# Patient Record
Sex: FEMALE | Race: WHITE | NOT HISPANIC OR LATINO | URBAN - METROPOLITAN AREA
[De-identification: names, ages, dates, MRNs, and addresses within clinical notes are randomized per-mention and may not be internally consistent; named-entity substitution may affect disease eponyms.]

---

## 2018-03-09 RX ORDER — SIMVASTATIN 20 MG/1
20 TABLET, FILM COATED ORAL NIGHTLY
Qty: 90 TABLET | Refills: 0 | Status: SHIPPED | OUTPATIENT
Start: 2018-03-09 | End: 2018-05-31 | Stop reason: SDUPTHER

## 2018-03-09 RX ORDER — SIMVASTATIN 20 MG/1
20 TABLET, FILM COATED ORAL NIGHTLY
COMMUNITY
Start: 2017-11-27 | End: 2018-03-09 | Stop reason: SDUPTHER

## 2018-05-30 RX ORDER — FLUTICASONE PROPIONATE 50 MCG
SPRAY, SUSPENSION (ML) NASAL
COMMUNITY
Start: 2014-09-22

## 2018-05-30 RX ORDER — CITALOPRAM 40 MG/1
40 TABLET, FILM COATED ORAL DAILY
COMMUNITY
Start: 2017-11-27 | End: 2018-08-17 | Stop reason: SDUPTHER

## 2018-05-30 RX ORDER — PANTOPRAZOLE SODIUM 40 MG/1
40 TABLET, DELAYED RELEASE ORAL
COMMUNITY
Start: 2017-05-15 | End: 2022-06-13 | Stop reason: SDUPTHER

## 2018-05-30 RX ORDER — LISINOPRIL 10 MG/1
10 TABLET ORAL DAILY
COMMUNITY
Start: 2018-01-22 | End: 2018-08-17 | Stop reason: SDUPTHER

## 2018-05-30 RX ORDER — FEXOFENADINE HCL AND PSEUDOEPHEDRINE HCL 180; 240 MG/1; MG/1
TABLET, EXTENDED RELEASE ORAL
COMMUNITY
Start: 2016-11-14 | End: 2019-03-25 | Stop reason: SDUPTHER

## 2018-05-31 ENCOUNTER — OFFICE VISIT (OUTPATIENT)
Dept: FAMILY MEDICINE | Facility: CLINIC | Age: 58
End: 2018-05-31
Attending: FAMILY MEDICINE
Payer: COMMERCIAL

## 2018-05-31 VITALS
HEART RATE: 84 BPM | OXYGEN SATURATION: 97 % | BODY MASS INDEX: 27.05 KG/M2 | WEIGHT: 147 LBS | DIASTOLIC BLOOD PRESSURE: 72 MMHG | TEMPERATURE: 98.9 F | HEIGHT: 62 IN | SYSTOLIC BLOOD PRESSURE: 114 MMHG | RESPIRATION RATE: 16 BRPM

## 2018-05-31 DIAGNOSIS — K21.9 GASTROESOPHAGEAL REFLUX DISEASE WITHOUT ESOPHAGITIS: ICD-10-CM

## 2018-05-31 DIAGNOSIS — J30.1 CHRONIC SEASONAL ALLERGIC RHINITIS DUE TO POLLEN: ICD-10-CM

## 2018-05-31 DIAGNOSIS — I10 ESSENTIAL HYPERTENSION: Primary | ICD-10-CM

## 2018-05-31 DIAGNOSIS — F32.A ANXIETY AND DEPRESSION: ICD-10-CM

## 2018-05-31 DIAGNOSIS — E78.2 MIXED HYPERLIPIDEMIA: ICD-10-CM

## 2018-05-31 DIAGNOSIS — F41.9 ANXIETY AND DEPRESSION: ICD-10-CM

## 2018-05-31 PROCEDURE — 99214 OFFICE O/P EST MOD 30 MIN: CPT | Performed by: FAMILY MEDICINE

## 2018-05-31 RX ORDER — SIMVASTATIN 20 MG/1
20 TABLET, FILM COATED ORAL NIGHTLY
Qty: 90 TABLET | Refills: 1 | Status: SHIPPED | OUTPATIENT
Start: 2018-05-31 | End: 2018-12-03

## 2018-05-31 ASSESSMENT — ENCOUNTER SYMPTOMS
FREQUENCY: 0
CHILLS: 0
COLOR CHANGE: 0
ARTHRALGIAS: 0
RHINORRHEA: 1
FATIGUE: 0
SHORTNESS OF BREATH: 0
DYSURIA: 0
HEADACHES: 0
DIARRHEA: 0
ABDOMINAL PAIN: 0
UNEXPECTED WEIGHT CHANGE: 0
SLEEP DISTURBANCE: 0
NUMBNESS: 0
CONSTIPATION: 0
NAUSEA: 0
COUGH: 0
BLOOD IN STOOL: 0
DIZZINESS: 0
ADENOPATHY: 0
HEMATURIA: 0
BRUISES/BLEEDS EASILY: 0
PALPITATIONS: 0

## 2018-05-31 NOTE — PROGRESS NOTES
Subjective      Patient ID: Lise Alonso is a 58 y.o. female.    HPI    Medical managemen of hypertension, hyperlipidemia  Pre visit care team analia performed  Following diet - mod exercise  Concerns : allergic sx - restarted allegra D    The following have been reviewed and updated as appropriate in this visit:  Problems       Review of Systems   Constitutional: Negative for chills, fatigue and unexpected weight change.   HENT: Positive for rhinorrhea.    Eyes: Negative for visual disturbance.   Respiratory: Negative for cough and shortness of breath.    Cardiovascular: Negative for chest pain, palpitations and leg swelling.   Gastrointestinal: Negative for abdominal pain, blood in stool, constipation, diarrhea and nausea.   Endocrine: Negative for cold intolerance and heat intolerance.   Genitourinary: Negative for dysuria, frequency, hematuria and urgency.   Musculoskeletal: Negative for arthralgias (right thumb/knees - mild).   Skin: Negative for color change.        Check right thigh   Neurological: Negative for dizziness, numbness and headaches.   Hematological: Negative for adenopathy. Does not bruise/bleed easily.   Psychiatric/Behavioral: Negative for sleep disturbance.       Current Outpatient Prescriptions   Medication Sig Dispense Refill   • Bifidobacterium infantis (ALIGN) 4 mg capsule 4 mg.     • citalopram (CELEXA) 40 mg tablet Take 40 mg by mouth daily.     • FA/mv,Ca,iron,min/lycopene/lut (MULTIVITAL ORAL) No SIG Entered     • fexofenadine-pseudoephedrine (ALLEGRA-D 24 HOUR) 180-240 mg per 24 hr tablet take 1 tablet by oral route  every day on an empty stomach with a glass of water     • fluticasone (FLONASE ALLERGY RELIEF) 50 mcg/actuation nasal spray inhale 1 spray (50MCG)  by intranasal route  every day in each nostril     • lisinopril (PRINIVIL) 10 mg tablet Take 10 mg by mouth daily.     • omega-3 fatty acids/fish oil (FISH OIL OMEGA 3-6-9 ORAL) No SIG Entered     • pantoprazole (PROTONIX) 40  "mg EC tablet Take 40 mg by mouth.     • simvastatin (ZOCOR) 20 mg tablet Take 1 tablet (20 mg total) by mouth nightly. 90 tablet 1     No current facility-administered medications for this visit.      History reviewed. No pertinent past medical history.  Family History   Problem Relation Age of Onset   • Heart disease Father    • Hyperlipidemia Father      Past Surgical History:   Procedure Laterality Date   • CHOLECYSTECTOMY     • OOPHORECTOMY     • TUBAL LIGATION       Allergies   Allergen Reactions   • Amoxicillin    • Azithromycin    • Erythromycin Base    • Levofloxacin    • Penicillins    • Sulfanilamide    • Tetracycline      Social History     Social History   • Marital status:      Spouse name: N/A   • Number of children: N/A   • Years of education: N/A     Occupational History   • Not on file.     Social History Main Topics   • Smoking status: Never Smoker   • Smokeless tobacco: Never Used   • Alcohol use Not on file   • Drug use: Unknown   • Sexual activity: Not on file     Other Topics Concern   • Not on file     Social History Narrative   • No narrative on file     Vitals:    05/31/18 1639 05/31/18 1656   BP: 112/78 114/72   BP Location:  Left upper arm   Pulse: 84    Resp: 16    Temp: 37.2 °C (98.9 °F)    SpO2: 97%    Weight: 66.7 kg (147 lb)    Height: 1.575 m (5' 2\")        Objective     Physical Exam   Constitutional: She is oriented to person, place, and time. She appears well-developed and well-nourished.   Eyes: EOM are normal. Pupils are equal, round, and reactive to light.   Fundoscopic exam:       The right eye shows no AV nicking.        The left eye shows no AV nicking.   Neck: Carotid bruit is not present. No thyromegaly present.   Cardiovascular: Normal rate, regular rhythm and normal pulses.    No murmur heard.  Pulses:       Dorsalis pedis pulses are 2+ on the right side, and 2+ on the left side.        Posterior tibial pulses are 2+ on the right side, and 2+ on the left side. "   Pulmonary/Chest: Effort normal and breath sounds normal.   Abdominal: Soft. Normal appearance and bowel sounds are normal. There is no tenderness. There is no CVA tenderness.   Lymphadenopathy:     She has no cervical adenopathy.     She has no axillary adenopathy.   Neurological: She is alert and oriented to person, place, and time. No sensory deficit.   Skin: Skin is warm, dry and intact. No rash noted.   Psychiatric: She has a normal mood and affect.       Assessment/Plan   Problem List Items Addressed This Visit     Chronic seasonal allergic rhinitis due to pollen     same meds - long term safety meds discussed         Anxiety and depression     same meds - long term safety meds discussed         Essential hypertension - Primary     Hypertension well controlled  Recommend regular exercise and low salt diet  Risk and potential complications of hypertension discussed  Importance of medication compliance and regular follow up emphasized  Role of medication/diet/exercise in treating hypertension discussed  Treatment plan and follow up reviewed and understood by patient         Gastroesophageal reflux disease without esophagitis     Patient was counselled regarding triggers for symptoms - avoid caffeine/spicey foods/NO smoking  Elevate HOB 30 degrees - DO NOT lie flat for at least 30 minutes after eating  Take medications as directed         Mixed hyperlipidemia     GOOD hyperlipidemia control  CONTINUE CURRENT THERAPY  Recommend low fat diet - Risks and potential complications of hyperlipidemia reviewed  Role of medications,diet, exercise in the treatment of hyperlipidemia discussed   Treatment plan and follow up reviewed and understood by patient         Relevant Medications    simvastatin (ZOCOR) 20 mg tablet          Jennifer Moulton DO  6/2/2018

## 2018-08-17 RX ORDER — CITALOPRAM 40 MG/1
TABLET, FILM COATED ORAL
Qty: 90 TABLET | Refills: 1 | Status: SHIPPED | OUTPATIENT
Start: 2018-08-17 | End: 2018-08-18 | Stop reason: SDUPTHER

## 2018-08-17 RX ORDER — LISINOPRIL 10 MG/1
TABLET ORAL
Qty: 90 TABLET | Refills: 1 | Status: SHIPPED | OUTPATIENT
Start: 2018-08-17 | End: 2018-08-18 | Stop reason: SDUPTHER

## 2018-08-19 RX ORDER — LISINOPRIL 10 MG/1
TABLET ORAL
Qty: 90 TABLET | Refills: 1 | Status: SHIPPED | OUTPATIENT
Start: 2018-08-19 | End: 2019-08-07 | Stop reason: SDUPTHER

## 2018-08-19 RX ORDER — CITALOPRAM 40 MG/1
TABLET, FILM COATED ORAL
Qty: 90 TABLET | Refills: 1 | Status: SHIPPED | OUTPATIENT
Start: 2018-08-19 | End: 2019-08-07 | Stop reason: SDUPTHER

## 2018-12-03 ENCOUNTER — OFFICE VISIT (OUTPATIENT)
Dept: FAMILY MEDICINE | Facility: CLINIC | Age: 58
End: 2018-12-03
Payer: COMMERCIAL

## 2018-12-03 VITALS
OXYGEN SATURATION: 97 % | SYSTOLIC BLOOD PRESSURE: 116 MMHG | BODY MASS INDEX: 27.79 KG/M2 | WEIGHT: 151 LBS | HEIGHT: 62 IN | RESPIRATION RATE: 16 BRPM | HEART RATE: 74 BPM | DIASTOLIC BLOOD PRESSURE: 74 MMHG | TEMPERATURE: 98.8 F

## 2018-12-03 DIAGNOSIS — I10 ESSENTIAL HYPERTENSION: ICD-10-CM

## 2018-12-03 DIAGNOSIS — F32.A ANXIETY AND DEPRESSION: ICD-10-CM

## 2018-12-03 DIAGNOSIS — K21.9 GASTROESOPHAGEAL REFLUX DISEASE WITHOUT ESOPHAGITIS: ICD-10-CM

## 2018-12-03 DIAGNOSIS — E78.2 MIXED HYPERLIPIDEMIA: ICD-10-CM

## 2018-12-03 DIAGNOSIS — Z82.49 FAMILY HISTORY OF CORONARY ARTERY DISEASE IN FATHER: ICD-10-CM

## 2018-12-03 DIAGNOSIS — Z00.00 WELL WOMAN EXAM WITHOUT GYNECOLOGICAL EXAM: Primary | ICD-10-CM

## 2018-12-03 DIAGNOSIS — J30.1 CHRONIC SEASONAL ALLERGIC RHINITIS DUE TO POLLEN: ICD-10-CM

## 2018-12-03 DIAGNOSIS — F41.9 ANXIETY AND DEPRESSION: ICD-10-CM

## 2018-12-03 PROCEDURE — 99396 PREV VISIT EST AGE 40-64: CPT | Mod: 25 | Performed by: FAMILY MEDICINE

## 2018-12-03 PROCEDURE — 93000 ELECTROCARDIOGRAM COMPLETE: CPT | Performed by: FAMILY MEDICINE

## 2018-12-03 ASSESSMENT — ENCOUNTER SYMPTOMS
WEAKNESS: 0
CHEST TIGHTNESS: 0
DYSURIA: 0
CONSTIPATION: 1
SEIZURES: 0
FATIGUE: 0
DYSPHORIC MOOD: 0
PALPITATIONS: 0
SORE THROAT: 0
SHORTNESS OF BREATH: 0
WHEEZING: 0
BRUISES/BLEEDS EASILY: 0
COUGH: 0
ARTHRALGIAS: 0
FREQUENCY: 0
TREMORS: 0
BLOOD IN STOOL: 0
ABDOMINAL PAIN: 1
LIGHT-HEADEDNESS: 0
SLEEP DISTURBANCE: 0
COLOR CHANGE: 0
VOICE CHANGE: 0
DIZZINESS: 0
HEADACHES: 0
DIARRHEA: 0
CHILLS: 0
BACK PAIN: 0
FEVER: 0
NAUSEA: 0
STRIDOR: 0
UNEXPECTED WEIGHT CHANGE: 0
ADENOPATHY: 0

## 2018-12-03 NOTE — PATIENT INSTRUCTIONS
Routine advice given on diet/exercise/weight  Recommend monthly self breast exams  Discussed colorectal screening - pros/cons of colonoscopy vs stool FIT - colon current  Recommend calcium with D3 -  Caltrate 500  Immunization update discussed

## 2018-12-03 NOTE — PROGRESS NOTES
Subjective      Patient ID: Lise Alonso is a 58 y.o. female.    HPI    Generally well  Following diet - reg exercise  Concerns : seeing GI - miralax / occ bladder pressure    The following have been reviewed and updated as appropriate in this visit:  Problems       Review of Systems   Constitutional: Negative for chills, fatigue, fever and unexpected weight change.   HENT: Negative for ear pain, hearing loss, sore throat, tinnitus and voice change.    Eyes: Negative for visual disturbance (eye exam due).   Respiratory: Negative for cough, chest tightness, shortness of breath, wheezing and stridor.    Cardiovascular: Negative for chest pain, palpitations and leg swelling.   Gastrointestinal: Positive for abdominal pain (known GERD - intermitt PPI/h2 blocker) and constipation. Negative for blood in stool, diarrhea and nausea.   Endocrine: Negative for cold intolerance and heat intolerance.   Genitourinary: Negative for dysuria, frequency, pelvic pain, vaginal bleeding and vaginal discharge.   Musculoskeletal: Negative for arthralgias, back pain and gait problem.   Skin: Negative for color change.   Neurological: Negative for dizziness, tremors, seizures, syncope, weakness, light-headedness and headaches.   Hematological: Negative for adenopathy. Does not bruise/bleed easily.   Psychiatric/Behavioral: Negative for dysphoric mood and sleep disturbance.       Current Outpatient Prescriptions   Medication Sig Dispense Refill   • Bifidobacterium infantis (ALIGN) 4 mg capsule 4 mg.     • citalopram (CELEXA) 40 mg tablet TAKE 1 TABLET BY MOUTH DAILY 90 tablet 1   • FA/mv,Ca,iron,min/lycopene/lut (MULTIVITAL ORAL) No SIG Entered     • fexofenadine-pseudoephedrine (ALLEGRA-D 24 HOUR) 180-240 mg per 24 hr tablet take 1 tablet by oral route  every day on an empty stomach with a glass of water     • fluticasone (FLONASE ALLERGY RELIEF) 50 mcg/actuation nasal spray inhale 1 spray (50MCG)  by intranasal route  every day in each  "nostril     • lisinopril (PRINIVIL) 10 mg tablet TAKE 1 TABLET BY MOUTH DAILY 90 tablet 1   • omega-3 fatty acids/fish oil (FISH OIL OMEGA 3-6-9 ORAL) No SIG Entered     • pantoprazole (PROTONIX) 40 mg EC tablet Take 40 mg by mouth.     • simvastatin (ZOCOR) 20 mg tablet TAKE 1 TABLET BY MOUTH NIGHTLY. 90 tablet 1     No current facility-administered medications for this visit.      History reviewed. No pertinent past medical history.  Family History   Problem Relation Age of Onset   • Heart disease Father    • Hyperlipidemia Father      Past Surgical History:   Procedure Laterality Date   • CHOLECYSTECTOMY     • OOPHORECTOMY     • TUBAL LIGATION       Allergies   Allergen Reactions   • Amoxicillin    • Azithromycin    • Erythromycin Base    • Levofloxacin    • Penicillins    • Sulfanilamide    • Tetracycline      Social History     Social History   • Marital status:      Spouse name: N/A   • Number of children: N/A   • Years of education: N/A     Occupational History   • Not on file.     Social History Main Topics   • Smoking status: Never Smoker   • Smokeless tobacco: Never Used   • Alcohol use Not on file   • Drug use: Unknown   • Sexual activity: Not on file     Other Topics Concern   • Not on file     Social History Narrative   • No narrative on file     Vitals:    12/03/18 1759 12/03/18 1820   BP: 122/70 116/74   BP Location:  Left upper arm   Patient Position:  Sitting   Pulse: 74    Resp: 16    Temp: 37.1 °C (98.8 °F)    SpO2: 97%    Weight: 68.5 kg (151 lb)    Height: 1.575 m (5' 2\")        Objective     Physical Exam   Constitutional: She is oriented to person, place, and time. She appears well-developed and well-nourished.   HENT:   Head: Normocephalic.   Right Ear: Tympanic membrane normal.   Left Ear: Tympanic membrane normal.   Nose: Nose normal.   Mouth/Throat: Uvula is midline and oropharynx is clear and moist.   Eyes: EOM and lids are normal. Pupils are equal, round, and reactive to light. " Lids are everted and swept, no foreign bodies found.   Fundoscopic exam:       The right eye shows no AV nicking and no exudate.        The left eye shows no AV nicking and no exudate.   Neck: Carotid bruit is not present. No thyromegaly present.   Cardiovascular: Normal rate, regular rhythm, normal heart sounds, intact distal pulses and normal pulses.    No murmur heard.  Pulmonary/Chest: Effort normal and breath sounds normal.   Abdominal: Soft. Normal appearance, normal aorta and bowel sounds are normal. She exhibits no abdominal bruit. There is no hepatosplenomegaly. There is no tenderness. There is no CVA tenderness. Hernia confirmed negative in the right inguinal area and confirmed negative in the left inguinal area.   Genitourinary: Vagina normal. Right adnexum displays no mass. Left adnexum displays no mass.   Lymphadenopathy:     She has no cervical adenopathy.     She has no axillary adenopathy.   Neurological: She is alert and oriented to person, place, and time.   Skin: Skin is warm. No rash noted.   Psychiatric: She has a normal mood and affect. Her speech is normal and behavior is normal. Cognition and memory are normal.       Assessment/Plan   Problem List Items Addressed This Visit     Chronic seasonal allergic rhinitis due to pollen     same meds - long term safety meds discussed         Anxiety and depression     same meds - long term safety meds discussed         Essential hypertension     Hypertension well controlled  Recommend regular exercise and low salt diet  Risk and potential complications of hypertension discussed  Importance of medication compliance and regular follow up emphasized  Role of medication/diet/exercise in treating hypertension discussed  Treatment plan and follow up reviewed and understood by patient         Relevant Orders    ECG 12 LEAD OFFICE PERFORMED (Completed)    CBC and Differential    Gastroesophageal reflux disease without esophagitis     Patient was counselled  regarding triggers for symptoms - avoid caffeine/spicey foods/NO smoking  Elevate HOB 30 degrees - DO NOT lie flat for at least 30 minutes after eating  Take medications as directed         Mixed hyperlipidemia     GOOD hyperlipidemia control  CONTINUE CURRENT THERAPY  Recommend low fat diet - Risks and potential complications of hyperlipidemia reviewed  Role of medications,diet, exercise in the treatment of hyperlipidemia discussed   Treatment plan and follow up reviewed and understood by patient         Relevant Orders    Comprehensive metabolic panel    Lipid panel    TSH 3rd Generation    Family history of coronary artery disease in father      Other Visit Diagnoses     Well woman exam without gynecological exam    -  Primary          Jennifer Madrigal-Jamarifracisco,   12/4/2018

## 2019-03-25 RX ORDER — FEXOFENADINE HCL AND PSEUDOEPHEDRINE HCL 180; 240 MG/1; MG/1
TABLET, EXTENDED RELEASE ORAL
Qty: 90 TABLET | Refills: 1 | Status: SHIPPED | OUTPATIENT
Start: 2019-03-25 | End: 2020-08-26 | Stop reason: SDUPTHER

## 2019-05-21 RX ORDER — SIMVASTATIN 20 MG/1
TABLET, FILM COATED ORAL
Qty: 90 TABLET | Refills: 1 | Status: SHIPPED | OUTPATIENT
Start: 2019-05-21 | End: 2019-11-15 | Stop reason: SDUPTHER

## 2019-06-04 LAB
ALBUMIN SERPL-MCNC: 4.5 G/DL (ref 3.6–5.1)
ALBUMIN/GLOB SERPL: 1.9 (CALC) (ref 1–2.5)
ALP SERPL-CCNC: 68 U/L (ref 33–130)
ALT SERPL-CCNC: 30 U/L (ref 6–29)
AST SERPL-CCNC: 23 U/L (ref 10–35)
BASOPHILS # BLD AUTO: 71 CELLS/UL (ref 0–200)
BASOPHILS NFR BLD AUTO: 1.5 %
BILIRUB SERPL-MCNC: 0.4 MG/DL (ref 0.2–1.2)
BUN SERPL-MCNC: 13 MG/DL (ref 7–25)
BUN/CREAT SERPL: ABNORMAL (CALC) (ref 6–22)
CALCIUM SERPL-MCNC: 9.2 MG/DL (ref 8.6–10.4)
CHLORIDE SERPL-SCNC: 107 MMOL/L (ref 98–110)
CHOLEST SERPL-MCNC: 206 MG/DL
CHOLEST/HDLC SERPL: 4.5 (CALC)
CO2 SERPL-SCNC: 26 MMOL/L (ref 20–32)
CREAT SERPL-MCNC: 0.88 MG/DL (ref 0.5–0.99)
EOSINOPHIL # BLD AUTO: 188 CELLS/UL (ref 15–500)
EOSINOPHIL NFR BLD AUTO: 4 %
ERYTHROCYTE [DISTWIDTH] IN BLOOD BY AUTOMATED COUNT: 12.5 % (ref 11–15)
GLOBULIN SER CALC-MCNC: 2.4 G/DL (CALC) (ref 1.9–3.7)
GLUCOSE SERPL-MCNC: 83 MG/DL (ref 65–99)
HCT VFR BLD AUTO: 38.6 % (ref 35–45)
HDLC SERPL-MCNC: 46 MG/DL
HGB BLD-MCNC: 12.7 G/DL (ref 11.7–15.5)
LDLC SERPL CALC-MCNC: 115 MG/DL (CALC)
LYMPHOCYTES # BLD AUTO: 1325 CELLS/UL (ref 850–3900)
LYMPHOCYTES NFR BLD AUTO: 28.2 %
MCH RBC QN AUTO: 29 PG (ref 27–33)
MCHC RBC AUTO-ENTMCNC: 32.9 G/DL (ref 32–36)
MCV RBC AUTO: 88.1 FL (ref 80–100)
MONOCYTES # BLD AUTO: 428 CELLS/UL (ref 200–950)
MONOCYTES NFR BLD AUTO: 9.1 %
NEUTROPHILS # BLD AUTO: 2688 CELLS/UL (ref 1500–7800)
NEUTROPHILS NFR BLD AUTO: 57.2 %
NONHDLC SERPL-MCNC: 160 MG/DL (CALC)
PLATELET # BLD AUTO: 242 THOUSAND/UL (ref 140–400)
PMV BLD REES-ECKER: 10.7 FL (ref 7.5–12.5)
POTASSIUM SERPL-SCNC: 4.5 MMOL/L (ref 3.5–5.3)
PROT SERPL-MCNC: 6.9 G/DL (ref 6.1–8.1)
QUEST EGFR NON-AFR. AMERICAN: 71 ML/MIN/1.73M2
RBC # BLD AUTO: 4.38 MILLION/UL (ref 3.8–5.1)
SODIUM SERPL-SCNC: 141 MMOL/L (ref 135–146)
TRIGL SERPL-MCNC: 316 MG/DL
TSH SERPL-ACNC: 2.06 MIU/L (ref 0.4–4.5)
WBC # BLD AUTO: 4.7 THOUSAND/UL (ref 3.8–10.8)

## 2019-06-10 ENCOUNTER — OFFICE VISIT (OUTPATIENT)
Dept: FAMILY MEDICINE | Facility: CLINIC | Age: 59
End: 2019-06-10
Payer: COMMERCIAL

## 2019-06-10 VITALS
RESPIRATION RATE: 16 BRPM | HEART RATE: 86 BPM | DIASTOLIC BLOOD PRESSURE: 70 MMHG | OXYGEN SATURATION: 99 % | BODY MASS INDEX: 27.6 KG/M2 | WEIGHT: 150 LBS | HEIGHT: 62 IN | SYSTOLIC BLOOD PRESSURE: 120 MMHG

## 2019-06-10 DIAGNOSIS — E78.2 MIXED HYPERLIPIDEMIA: ICD-10-CM

## 2019-06-10 DIAGNOSIS — F32.A ANXIETY AND DEPRESSION: ICD-10-CM

## 2019-06-10 DIAGNOSIS — K21.9 GASTROESOPHAGEAL REFLUX DISEASE WITHOUT ESOPHAGITIS: ICD-10-CM

## 2019-06-10 DIAGNOSIS — I10 ESSENTIAL HYPERTENSION: Primary | ICD-10-CM

## 2019-06-10 DIAGNOSIS — J30.1 CHRONIC SEASONAL ALLERGIC RHINITIS DUE TO POLLEN: ICD-10-CM

## 2019-06-10 DIAGNOSIS — F41.9 ANXIETY AND DEPRESSION: ICD-10-CM

## 2019-06-10 PROCEDURE — 99214 OFFICE O/P EST MOD 30 MIN: CPT | Performed by: FAMILY MEDICINE

## 2019-06-10 ASSESSMENT — ENCOUNTER SYMPTOMS
BRUISES/BLEEDS EASILY: 0
ADENOPATHY: 0
BLOOD IN STOOL: 0
PALPITATIONS: 0
HEMATURIA: 0
COLOR CHANGE: 0
NUMBNESS: 0
SLEEP DISTURBANCE: 0
DIARRHEA: 0
HEADACHES: 0
FREQUENCY: 0
DIZZINESS: 0
CONSTIPATION: 0
DYSURIA: 0
COUGH: 0
ABDOMINAL PAIN: 0
FATIGUE: 0
UNEXPECTED WEIGHT CHANGE: 0
CHILLS: 0
ARTHRALGIAS: 1
NAUSEA: 0
SHORTNESS OF BREATH: 0

## 2019-06-10 NOTE — PROGRESS NOTES
Subjective      Patient ID: Lise Alonso is a 59 y.o. female.    HPI     Medical management of hypertension, hyperlipidemia  Pre visit care team analia performed  Following diet - reg exercise  Concerns - NONE    The following have been reviewed and updated as appropriate in this visit:       Review of Systems   Constitutional: Negative for chills, fatigue and unexpected weight change.   Eyes: Negative for visual disturbance.   Respiratory: Negative for cough and shortness of breath.    Cardiovascular: Negative for chest pain, palpitations and leg swelling.   Gastrointestinal: Negative for abdominal pain (occ GERD SX - PRN pantoprazole use), blood in stool, constipation, diarrhea and nausea.   Endocrine: Negative for cold intolerance and heat intolerance.   Genitourinary: Negative for dysuria, frequency, hematuria and urgency.   Musculoskeletal: Positive for arthralgias (occ achiness).   Skin: Negative for color change.   Neurological: Negative for dizziness, numbness and headaches.   Hematological: Negative for adenopathy. Does not bruise/bleed easily.   Psychiatric/Behavioral: Negative for sleep disturbance.       Current Outpatient Prescriptions   Medication Sig Dispense Refill   • citalopram (CELEXA) 40 mg tablet TAKE 1 TABLET BY MOUTH DAILY 90 tablet 1   • FA/mv,Ca,iron,min/lycopene/lut (MULTIVITAL ORAL) No SIG Entered     • fexofenadine-pseudoephedrine (ALLEGRA-D 24 HOUR) 180-240 mg per 24 hr tablet take 1 tablet by oral route  every day on an empty stomach with a glass of water 90 tablet 1   • fluticasone (FLONASE ALLERGY RELIEF) 50 mcg/actuation nasal spray inhale 1 spray (50MCG)  by intranasal route  every day in each nostril     • lisinopril (PRINIVIL) 10 mg tablet TAKE 1 TABLET BY MOUTH DAILY 90 tablet 1   • omega-3 fatty acids/fish oil (FISH OIL OMEGA 3-6-9 ORAL) No SIG Entered     • pantoprazole (PROTONIX) 40 mg EC tablet Take 40 mg by mouth.     • ranitidine (ZANTAC) 150 mg tablet Take 150 mg by mouth  "2 times daily.     • simvastatin (ZOCOR) 20 mg tablet TAKE 1 TABLET BY MOUTH NIGHTLY AS DIRECTED 90 tablet 1     No current facility-administered medications for this visit.      History reviewed. No pertinent past medical history.  Family History   Problem Relation Age of Onset   • Heart disease Father    • Hyperlipidemia Father      Past Surgical History:   Procedure Laterality Date   • CHOLECYSTECTOMY     • OOPHORECTOMY     • TUBAL LIGATION       Allergies   Allergen Reactions   • Amoxicillin    • Azithromycin    • Erythromycin Base    • Levofloxacin    • Penicillins    • Sulfanilamide    • Tetracycline      Social History     Social History   • Marital status:      Spouse name: N/A   • Number of children: N/A   • Years of education: N/A     Occupational History   • Not on file.     Social History Main Topics   • Smoking status: Never Smoker   • Smokeless tobacco: Never Used   • Alcohol use Not on file   • Drug use: Unknown   • Sexual activity: Not on file     Other Topics Concern   • Not on file     Social History Narrative   • No narrative on file     Vitals:    06/10/19 1727 06/10/19 1746   BP: 130/82 120/70   BP Location:  Left upper arm   Patient Position:  Sitting   Pulse: 86    Resp: 16    SpO2: 99%    Weight: 68 kg (150 lb)    Height: 1.575 m (5' 2\")        Objective     Physical Exam   Constitutional: She is oriented to person, place, and time. She appears well-developed and well-nourished.   Eyes: Pupils are equal, round, and reactive to light. EOM are normal.   Fundoscopic exam:       The right eye shows no AV nicking.        The left eye shows no AV nicking.   Neck: Carotid bruit is not present. No thyromegaly present.   Cardiovascular: Normal rate, regular rhythm, intact distal pulses and normal pulses.    No murmur heard.  Pulses:       Dorsalis pedis pulses are 2+ on the right side, and 2+ on the left side.        Posterior tibial pulses are 2+ on the right side, and 2+ on the left side. "   Pulmonary/Chest: Effort normal and breath sounds normal.   Abdominal: Soft. Normal appearance and bowel sounds are normal. There is no hepatosplenomegaly. There is no tenderness. There is no CVA tenderness.   Lymphadenopathy:     She has no cervical adenopathy.     She has no axillary adenopathy.   Neurological: She is alert and oriented to person, place, and time. No sensory deficit.   Skin: Skin is warm, dry and intact. No rash noted.   Psychiatric: She has a normal mood and affect. Her speech is normal and behavior is normal. Cognition and memory are normal.       Assessment/Plan   Problem List Items Addressed This Visit     Chronic seasonal allergic rhinitis due to pollen     same meds - long term safety meds discussed         Anxiety and depression     same meds - long term safety meds discussed         Essential hypertension - Primary     Hypertension well controlled  Recommend regular exercise and low salt diet  Risk and potential complications of hypertension discussed  Importance of medication compliance and regular follow up emphasized  Role of medication/diet/exercise in treating hypertension discussed  Treatment plan and follow up reviewed and understood by patient         Gastroesophageal reflux disease without esophagitis     Patient was counselled regarding triggers for symptoms - avoid caffeine/spicey foods/NO smoking  Elevate HOB 30 degrees - DO NOT lie flat for at least 30 minutes after eating  Take medications as directed         Relevant Medications    ranitidine (ZANTAC) 150 mg tablet    Mixed hyperlipidemia     GOOD hyperlipidemia control  CONTINUE CURRENT THERAPY  Recommend low fat diet - Risks and potential complications of hyperlipidemia reviewed  Role of medications,diet, exercise in the treatment of hyperlipidemia discussed   Treatment plan and follow up reviewed and understood by patient         Relevant Orders    Lipid panel    Comprehensive metabolic panel          Jennifer MARIE  Saige,   6/11/2019

## 2019-08-07 RX ORDER — CITALOPRAM 40 MG/1
TABLET, FILM COATED ORAL
Qty: 90 TABLET | Refills: 1 | Status: SHIPPED | OUTPATIENT
Start: 2019-08-07 | End: 2020-01-29

## 2019-08-07 RX ORDER — LISINOPRIL 10 MG/1
TABLET ORAL
Qty: 90 TABLET | Refills: 1 | Status: SHIPPED | OUTPATIENT
Start: 2019-08-07 | End: 2020-01-29

## 2019-08-07 NOTE — TELEPHONE ENCOUNTER
Medicine Refill Request    Last Office Visit: 6/10/2019  Next Office Visit: 12/9/2019        Current Outpatient Prescriptions:   •  citalopram (CELEXA) 40 mg tablet, TAKE 1 TABLET BY MOUTH DAILY, Disp: 90 tablet, Rfl: 1  •  FA/mv,Ca,iron,min/lycopene/lut (MULTIVITAL ORAL), No SIG Entered, Disp: , Rfl:   •  fexofenadine-pseudoephedrine (ALLEGRA-D 24 HOUR) 180-240 mg per 24 hr tablet, take 1 tablet by oral route  every day on an empty stomach with a glass of water, Disp: 90 tablet, Rfl: 1  •  fluticasone (FLONASE ALLERGY RELIEF) 50 mcg/actuation nasal spray, inhale 1 spray (50MCG)  by intranasal route  every day in each nostril, Disp: , Rfl:   •  lisinopril (PRINIVIL) 10 mg tablet, TAKE 1 TABLET BY MOUTH DAILY, Disp: 90 tablet, Rfl: 1  •  omega-3 fatty acids/fish oil (FISH OIL OMEGA 3-6-9 ORAL), No SIG Entered, Disp: , Rfl:   •  pantoprazole (PROTONIX) 40 mg EC tablet, Take 40 mg by mouth., Disp: , Rfl:   •  ranitidine (ZANTAC) 150 mg tablet, Take 150 mg by mouth 2 times daily., Disp: , Rfl:   •  simvastatin (ZOCOR) 20 mg tablet, TAKE 1 TABLET BY MOUTH NIGHTLY AS DIRECTED, Disp: 90 tablet, Rfl: 1      BP Readings from Last 3 Encounters:   06/10/19 120/70   12/03/18 116/74   05/31/18 114/72       Recent Lab results:  Lab Results   Component Value Date    CHOL 206 (H) 06/03/2019   ,   Lab Results   Component Value Date    HDL 46 (L) 06/03/2019   ,   Lab Results   Component Value Date    LDLCALC 115 (H) 06/03/2019   ,   Lab Results   Component Value Date    TRIG 316 (H) 06/03/2019        Lab Results   Component Value Date    GLUCOSE 83 06/03/2019   , No results found for: HGBA1C      Lab Results   Component Value Date    CREATININE 0.88 06/03/2019       Lab Results   Component Value Date    TSH 2.06 06/03/2019

## 2019-11-15 RX ORDER — SIMVASTATIN 20 MG/1
TABLET, FILM COATED ORAL
Qty: 90 TABLET | Refills: 1 | Status: SHIPPED | OUTPATIENT
Start: 2019-11-15 | End: 2020-05-13

## 2019-12-09 ENCOUNTER — OFFICE VISIT (OUTPATIENT)
Dept: FAMILY MEDICINE | Facility: CLINIC | Age: 59
End: 2019-12-09
Payer: COMMERCIAL

## 2019-12-09 VITALS
BODY MASS INDEX: 26.68 KG/M2 | HEIGHT: 62 IN | TEMPERATURE: 98.3 F | SYSTOLIC BLOOD PRESSURE: 120 MMHG | WEIGHT: 145 LBS | OXYGEN SATURATION: 97 % | DIASTOLIC BLOOD PRESSURE: 80 MMHG | RESPIRATION RATE: 16 BRPM | HEART RATE: 74 BPM

## 2019-12-09 DIAGNOSIS — F32.A ANXIETY AND DEPRESSION: ICD-10-CM

## 2019-12-09 DIAGNOSIS — J30.1 CHRONIC SEASONAL ALLERGIC RHINITIS DUE TO POLLEN: ICD-10-CM

## 2019-12-09 DIAGNOSIS — R07.89 ATYPICAL CHEST PAIN: ICD-10-CM

## 2019-12-09 DIAGNOSIS — F41.9 ANXIETY AND DEPRESSION: ICD-10-CM

## 2019-12-09 DIAGNOSIS — Z82.49 FAMILY HISTORY OF CORONARY ARTERY DISEASE IN FATHER: ICD-10-CM

## 2019-12-09 DIAGNOSIS — E78.2 MIXED HYPERLIPIDEMIA: ICD-10-CM

## 2019-12-09 DIAGNOSIS — Z00.00 WELL WOMAN EXAM WITHOUT GYNECOLOGICAL EXAM: Primary | ICD-10-CM

## 2019-12-09 DIAGNOSIS — I10 ESSENTIAL HYPERTENSION: ICD-10-CM

## 2019-12-09 DIAGNOSIS — K21.9 GASTROESOPHAGEAL REFLUX DISEASE WITHOUT ESOPHAGITIS: ICD-10-CM

## 2019-12-09 PROCEDURE — 93000 ELECTROCARDIOGRAM COMPLETE: CPT | Performed by: FAMILY MEDICINE

## 2019-12-09 PROCEDURE — 99396 PREV VISIT EST AGE 40-64: CPT | Mod: 25 | Performed by: FAMILY MEDICINE

## 2019-12-09 RX ORDER — SIMVASTATIN 20 MG/1
20 TABLET, FILM COATED ORAL NIGHTLY
Qty: 90 TABLET | Refills: 1 | Status: CANCELLED | OUTPATIENT
Start: 2019-12-09

## 2019-12-09 RX ORDER — LISINOPRIL 10 MG/1
10 TABLET ORAL
Qty: 90 TABLET | Refills: 1 | Status: CANCELLED | OUTPATIENT
Start: 2019-12-09

## 2019-12-09 RX ORDER — CITALOPRAM 40 MG/1
40 TABLET, FILM COATED ORAL
Qty: 90 TABLET | Refills: 1 | Status: CANCELLED | OUTPATIENT
Start: 2019-12-09

## 2019-12-09 ASSESSMENT — ENCOUNTER SYMPTOMS
NAUSEA: 0
CHEST TIGHTNESS: 0
BLOOD IN STOOL: 0
SLEEP DISTURBANCE: 0
ARTHRALGIAS: 1
UNEXPECTED WEIGHT CHANGE: 1
LIGHT-HEADEDNESS: 0
SHORTNESS OF BREATH: 0
DIZZINESS: 0
DYSURIA: 0
DYSPHORIC MOOD: 0
TREMORS: 0
STRIDOR: 0
FATIGUE: 0
HEADACHES: 0
SORE THROAT: 0
VOICE CHANGE: 0
WHEEZING: 0
COLOR CHANGE: 0
ABDOMINAL PAIN: 0
WEAKNESS: 0
FEVER: 0
CHILLS: 0
DIARRHEA: 0
CONSTIPATION: 0
FREQUENCY: 0
PALPITATIONS: 0
BACK PAIN: 0
COUGH: 0
SEIZURES: 0
BRUISES/BLEEDS EASILY: 0

## 2019-12-09 NOTE — PROGRESS NOTES
Subjective      Patient ID: Lise Alonso is a 59 y.o. female.    HPI     Generally well  Following diet -   Concerns : achiness left ACW/ upper back - 3 of 10                                At rest -                           Knits/ computer work                       ? Lump upper back                      eipisode 2-3 months ago chest pain into jaw - ? Related to allegra D - no recurrence    GYN dr valadez    The following have been reviewed and updated as appropriate in this visit:  Allergies  Meds  Problems       Review of Systems   Constitutional: Positive for unexpected weight change. Negative for chills, fatigue and fever.   HENT: Negative for ear pain, hearing loss, sore throat, tinnitus and voice change.    Respiratory: Negative for cough, chest tightness, shortness of breath, wheezing and stridor.    Cardiovascular: Positive for chest pain. Negative for palpitations and leg swelling.   Gastrointestinal: Negative for abdominal pain, blood in stool, constipation, diarrhea and nausea.   Endocrine: Negative for cold intolerance and heat intolerance.   Genitourinary: Negative for dysuria, frequency, pelvic pain, vaginal bleeding and vaginal discharge.   Musculoskeletal: Positive for arthralgias (thumb lump). Negative for back pain and gait problem.   Skin: Negative for color change.   Neurological: Negative for dizziness, tremors, seizures, syncope, weakness, light-headedness and headaches.   Hematological: Does not bruise/bleed easily.   Psychiatric/Behavioral: Negative for dysphoric mood and sleep disturbance.       Current Outpatient Medications   Medication Sig Dispense Refill   • citalopram (celeXA) 40 mg tablet TAKE 1 TABLET BY MOUTH EVERY DAY 90 tablet 1   • FA/mv,Ca,iron,min/lycopene/lut (MULTIVITAL ORAL) No SIG Entered     • fexofenadine-pseudoephedrine (ALLEGRA-D 24 HOUR) 180-240 mg per 24 hr tablet take 1 tablet by oral route  every day on an empty stomach with a glass of water 90 tablet 1   •  fluticasone (FLONASE ALLERGY RELIEF) 50 mcg/actuation nasal spray inhale 1 spray (50MCG)  by intranasal route  every day in each nostril     • lisinopril (PRINIVIL) 10 mg tablet TAKE 1 TABLET BY MOUTH EVERY DAY 90 tablet 1   • omega-3 fatty acids/fish oil (FISH OIL OMEGA 3-6-9 ORAL) No SIG Entered     • pantoprazole (PROTONIX) 40 mg EC tablet Take 40 mg by mouth.     • simvastatin (ZOCOR) 20 mg tablet TAKE 1 TABLET BY MOUTH NIGHTLY AS DIRECTED 90 tablet 1     No current facility-administered medications for this visit.      History reviewed. No pertinent past medical history.  Family History   Problem Relation Age of Onset   • Heart disease Biological Father    • Hyperlipidemia Biological Father      Past Surgical History:   Procedure Laterality Date   • CHOLECYSTECTOMY     • OOPHORECTOMY     • TUBAL LIGATION       Allergies   Allergen Reactions   • Amoxicillin    • Azithromycin    • Erythromycin Base    • Levofloxacin    • Penicillins    • Sulfanilamide    • Tetracycline      Social History     Socioeconomic History   • Marital status:      Spouse name: Not on file   • Number of children: Not on file   • Years of education: Not on file   • Highest education level: Not on file   Occupational History   • Not on file   Social Needs   • Financial resource strain: Not on file   • Food insecurity:     Worry: Not on file     Inability: Not on file   • Transportation needs:     Medical: Not on file     Non-medical: Not on file   Tobacco Use   • Smoking status: Never Smoker   • Smokeless tobacco: Never Used   Substance and Sexual Activity   • Alcohol use: Not on file   • Drug use: Not on file   • Sexual activity: Not on file   Lifestyle   • Physical activity:     Days per week: Not on file     Minutes per session: Not on file   • Stress: Not on file   Relationships   • Social connections:     Talks on phone: Not on file     Gets together: Not on file     Attends Roman [employer] service: Not on file     Active member of  "club or organization: Not on file     Attends meetings of clubs or organizations: Not on file     Relationship status: Not on file   • Intimate partner violence:     Fear of current or ex partner: Not on file     Emotionally abused: Not on file     Physically abused: Not on file     Forced sexual activity: Not on file   Other Topics Concern   • Not on file   Social History Narrative   • Not on file     Vitals:    12/09/19 1654 12/09/19 1716   BP: 124/70 120/80   BP Location:  Left upper arm   Patient Position:  Sitting   Pulse: 74    Resp: 16    Temp: 36.8 °C (98.3 °F)    SpO2: 97%    Weight: 65.8 kg (145 lb)    Height: 1.575 m (5' 2\")        Objective     Physical Exam   Constitutional: She is oriented to person, place, and time. She appears well-developed and well-nourished.   HENT:   Head: Normocephalic.   Right Ear: Tympanic membrane normal.   Left Ear: Tympanic membrane normal.   Nose: Nose normal.   Mouth/Throat: Uvula is midline and oropharynx is clear and moist.   Eyes: Pupils are equal, round, and reactive to light. EOM and lids are normal. Lids are everted and swept, no foreign bodies found.   Fundoscopic exam:       The right eye shows no AV nicking.        The left eye shows no AV nicking.   Neck: Carotid bruit is not present. No thyromegaly present.   Cardiovascular: Normal rate, regular rhythm, normal heart sounds, intact distal pulses and normal pulses.   No murmur heard.  Pulmonary/Chest: Effort normal and breath sounds normal.   Abdominal: Soft. Normal appearance, normal aorta and bowel sounds are normal. She exhibits no abdominal bruit. There is no hepatosplenomegaly. There is no tenderness. There is no CVA tenderness. Hernia confirmed negative in the right inguinal area and confirmed negative in the left inguinal area.   Genitourinary: Vagina normal. Right adnexum displays no mass. Left adnexum displays no mass.   Lymphadenopathy:     She has no cervical adenopathy.     She has no axillary " adenopathy.   Neurological: She is alert and oriented to person, place, and time.   Skin: Skin is warm. No rash noted.   Psychiatric: She has a normal mood and affect. Her speech is normal and behavior is normal. Cognition and memory are normal.       Assessment/Plan   Problem List Items Addressed This Visit        Respiratory    Chronic seasonal allergic rhinitis due to pollen     same meds - long term safety meds discussed            Circulatory    Essential hypertension     Hypertension well controlled  Recommend regular exercise and low salt diet  Risk and potential complications of hypertension discussed  Importance of medication compliance and regular follow up emphasized  Role of medication/diet/exercise in treating hypertension discussed  Treatment plan and follow up reviewed and understood by patient         Relevant Orders    Comprehensive metabolic panel       Digestive    Gastroesophageal reflux disease without esophagitis     Patient was counselled regarding triggers for symptoms - avoid caffeine/spicey foods/NO smoking  Elevate HOB 30 degrees - DO NOT lie flat for at least 30 minutes after eating  Take medications as directed            Endocrine/Metabolic    Mixed hyperlipidemia     GOOD hyperlipidemia control  CONTINUE CURRENT THERAPY  Recommend low fat diet - Risks and potential complications of hyperlipidemia reviewed  Role of medications,diet, exercise in the treatment of hyperlipidemia discussed   Treatment plan and follow up reviewed and understood by patient         Relevant Orders    Lipid panel    TSH 3rd Generation       Other    Anxiety and depression     same meds - long term safety meds discussed         Family history of coronary artery disease in father     Risk factor reduction discussed           Other Visit Diagnoses     Well woman exam without gynecological exam    -  Primary    Atypical chest pain        counseled EKG normal     Relevant Orders    ECG 12 LEAD OFFICE PERFORMED  (Completed)          Jennifer Moulton,   12/11/2019

## 2020-01-29 RX ORDER — LISINOPRIL 10 MG/1
TABLET ORAL
Qty: 90 TABLET | Refills: 1 | Status: SHIPPED | OUTPATIENT
Start: 2020-01-29 | End: 2020-07-23

## 2020-01-29 RX ORDER — CITALOPRAM 40 MG/1
TABLET, FILM COATED ORAL
Qty: 90 TABLET | Refills: 1 | Status: SHIPPED | OUTPATIENT
Start: 2020-01-29 | End: 2020-07-23

## 2020-05-13 RX ORDER — SIMVASTATIN 20 MG/1
TABLET, FILM COATED ORAL
Qty: 90 TABLET | Refills: 1 | Status: SHIPPED | OUTPATIENT
Start: 2020-05-13 | End: 2020-11-06

## 2020-06-11 ENCOUNTER — OFFICE VISIT (OUTPATIENT)
Dept: FAMILY MEDICINE | Facility: CLINIC | Age: 60
End: 2020-06-11
Payer: COMMERCIAL

## 2020-06-11 VITALS
SYSTOLIC BLOOD PRESSURE: 124 MMHG | OXYGEN SATURATION: 97 % | HEART RATE: 74 BPM | TEMPERATURE: 96.9 F | RESPIRATION RATE: 16 BRPM | WEIGHT: 148 LBS | HEIGHT: 62 IN | BODY MASS INDEX: 27.23 KG/M2 | DIASTOLIC BLOOD PRESSURE: 80 MMHG

## 2020-06-11 DIAGNOSIS — L65.9 HAIR LOSS: ICD-10-CM

## 2020-06-11 DIAGNOSIS — F32.A ANXIETY AND DEPRESSION: ICD-10-CM

## 2020-06-11 DIAGNOSIS — F41.9 ANXIETY AND DEPRESSION: ICD-10-CM

## 2020-06-11 DIAGNOSIS — K21.9 GASTROESOPHAGEAL REFLUX DISEASE WITHOUT ESOPHAGITIS: ICD-10-CM

## 2020-06-11 DIAGNOSIS — H65.01 NON-RECURRENT ACUTE SEROUS OTITIS MEDIA OF RIGHT EAR: ICD-10-CM

## 2020-06-11 DIAGNOSIS — I10 ESSENTIAL HYPERTENSION: Primary | ICD-10-CM

## 2020-06-11 DIAGNOSIS — J30.1 CHRONIC SEASONAL ALLERGIC RHINITIS DUE TO POLLEN: ICD-10-CM

## 2020-06-11 DIAGNOSIS — E78.2 MIXED HYPERLIPIDEMIA: ICD-10-CM

## 2020-06-11 PROCEDURE — 99214 OFFICE O/P EST MOD 30 MIN: CPT | Performed by: FAMILY MEDICINE

## 2020-06-11 RX ORDER — METHYLPREDNISOLONE 4 MG/1
TABLET ORAL
Qty: 21 TABLET | Refills: 0 | Status: SHIPPED | OUTPATIENT
Start: 2020-06-11 | End: 2020-08-26

## 2020-06-11 ASSESSMENT — ENCOUNTER SYMPTOMS
DYSURIA: 0
ADENOPATHY: 0
CONSTIPATION: 0
BACK PAIN: 0
BRUISES/BLEEDS EASILY: 0
FATIGUE: 0
SHORTNESS OF BREATH: 0
UNEXPECTED WEIGHT CHANGE: 0
CHILLS: 0
BLOOD IN STOOL: 0
DIARRHEA: 0
SLEEP DISTURBANCE: 1
DYSPHORIC MOOD: 0
ARTHRALGIAS: 0
ABDOMINAL PAIN: 0
PALPITATIONS: 0
HEADACHES: 0
NAUSEA: 0
FREQUENCY: 0
COLOR CHANGE: 0
NUMBNESS: 0
DIZZINESS: 0
HEMATURIA: 0
COUGH: 0

## 2020-06-11 NOTE — PROGRESS NOTES
Subjective      Patient ID: Lise Alonso is a 60 y.o. female.    HPI    Medical management of hypertension, hyperlipidemia  Pre visit care team analia performed  Following diet - reg exercise  Concerns : allergy SX - right ear intermitt clogged - allegra D                     Hair loss                     Did not get labs -? Update                     Check skin lesion left leg                       Colonoscopy dr eduardo , [place] 10/19  Dr valadez - pap    The following have been reviewed and updated as appropriate in this visit:  Allergies  Meds  Problems       Review of Systems   Constitutional: Negative for chills, fatigue and unexpected weight change.   Eyes: Negative for visual disturbance (exam upcoming).   Respiratory: Negative for cough and shortness of breath.    Cardiovascular: Negative for chest pain, palpitations and leg swelling.   Gastrointestinal: Negative for abdominal pain, blood in stool, constipation, diarrhea and nausea.   Endocrine: Negative for cold intolerance and heat intolerance.   Genitourinary: Negative for dysuria, frequency, hematuria and urgency.   Musculoskeletal: Negative for arthralgias and back pain.   Skin: Negative for color change.   Neurological: Negative for dizziness, numbness and headaches.   Hematological: Negative for adenopathy. Does not bruise/bleed easily.   Psychiatric/Behavioral: Positive for sleep disturbance. Negative for dysphoric mood.       Current Outpatient Medications   Medication Sig Dispense Refill   • citalopram (celeXA) 40 mg tablet TAKE 1 TABLET BY MOUTH EVERY DAY 90 tablet 1   • FA/mv,Ca,iron,min/lycopene/lut (MULTIVITAL ORAL) No SIG Entered     • fexofenadine-pseudoephedrine (ALLEGRA-D 24 HOUR) 180-240 mg per 24 hr tablet take 1 tablet by oral route  every day on an empty stomach with a glass of water 90 tablet 1   • fluticasone (FLONASE ALLERGY RELIEF) 50 mcg/actuation nasal spray inhale 1 spray (50MCG)  by intranasal route  every day in each nostril     •  lisinopril (PRINIVIL) 10 mg tablet TAKE 1 TABLET BY MOUTH EVERY DAY 90 tablet 1   • omega-3 fatty acids/fish oil (FISH OIL OMEGA 3-6-9 ORAL) No SIG Entered     • pantoprazole (PROTONIX) 40 mg EC tablet Take 40 mg by mouth.     • simvastatin (ZOCOR) 20 mg tablet TAKE 1 TABLET BY MOUTH NIGHTLY AS DIRECTED 90 tablet 1   • methylPREDNISolone (MEDROL DOSEPACK) 4 mg tablet Follow package directions. 21 tablet 0     No current facility-administered medications for this visit.      No past medical history on file.  Family History   Problem Relation Age of Onset   • Heart disease Biological Father    • Hyperlipidemia Biological Father      Past Surgical History:   Procedure Laterality Date   • CHOLECYSTECTOMY     • OOPHORECTOMY     • TUBAL LIGATION       Allergies   Allergen Reactions   • Amoxicillin    • Azithromycin    • Erythromycin Base    • Levofloxacin    • Penicillins    • Sulfanilamide    • Tetracycline      Social History     Socioeconomic History   • Marital status:      Spouse name: Not on file   • Number of children: Not on file   • Years of education: Not on file   • Highest education level: Not on file   Occupational History   • Not on file   Social Needs   • Financial resource strain: Not on file   • Food insecurity:     Worry: Not on file     Inability: Not on file   • Transportation needs:     Medical: Not on file     Non-medical: Not on file   Tobacco Use   • Smoking status: Never Smoker   • Smokeless tobacco: Never Used   Substance and Sexual Activity   • Alcohol use: Not on file   • Drug use: Not on file   • Sexual activity: Not on file   Lifestyle   • Physical activity:     Days per week: Not on file     Minutes per session: Not on file   • Stress: Not on file   Relationships   • Social connections:     Talks on phone: Not on file     Gets together: Not on file     Attends [employer] service: Not on file     Active member of club or organization: Not on file     Attends meetings of clubs or  "organizations: Not on file     Relationship status: Not on file   • Intimate partner violence:     Fear of current or ex partner: Not on file     Emotionally abused: Not on file     Physically abused: Not on file     Forced sexual activity: Not on file   Other Topics Concern   • Not on file   Social History Narrative   • Not on file     Vitals:    06/11/20 1626 06/11/20 1645   BP: 118/70 124/80   BP Location:  Left upper arm   Patient Position:  Sitting   Pulse: 74    Resp: 16    Temp: (!) 36.1 °C (96.9 °F)    SpO2: 97%    Weight: 67.1 kg (148 lb)    Height: 1.575 m (5' 2\")        Objective     Physical Exam   Constitutional: She is oriented to person, place, and time. She appears well-developed and well-nourished.   Eyes: Pupils are equal, round, and reactive to light. EOM are normal.   Fundoscopic exam:       The right eye shows no AV nicking.        The left eye shows no AV nicking.   Neck: Carotid bruit is not present. No thyromegaly present.   Cardiovascular: Normal rate, regular rhythm, intact distal pulses and normal pulses.   No murmur heard.  Pulses:       Dorsalis pedis pulses are 2+ on the right side, and 2+ on the left side.        Posterior tibial pulses are 2+ on the right side, and 2+ on the left side.   Pulmonary/Chest: Effort normal and breath sounds normal.   Abdominal: Soft. Normal appearance and bowel sounds are normal. She exhibits distension. She exhibits no abdominal bruit and no mass. There is no tenderness. There is no CVA tenderness.   Lymphadenopathy:     She has no cervical adenopathy.     She has no axillary adenopathy.   Neurological: She is alert and oriented to person, place, and time. No sensory deficit.   Skin: Skin is warm, dry and intact. No rash noted.   Psychiatric: She has a normal mood and affect. Her speech is normal and behavior is normal. Cognition and memory are normal.       Assessment/Plan   Problem List Items Addressed This Visit        Respiratory    Chronic seasonal " allergic rhinitis due to pollen     same meds - long term safety meds discussed         Relevant Medications    methylPREDNISolone (MEDROL DOSEPACK) 4 mg tablet       Circulatory    Essential hypertension - Primary     Hypertension well controlled  Recommend regular exercise and low salt diet  Risk and potential complications of hypertension discussed  Importance of medication compliance and regular follow up emphasized  Role of medication/diet/exercise in treating hypertension discussed  Treatment plan and follow up reviewed and understood by patient         Relevant Orders    CBC and Differential       Digestive    Gastroesophageal reflux disease without esophagitis     Patient was counselled regarding triggers for symptoms - avoid caffeine/spicey foods/NO smoking  Elevate HOB 30 degrees - DO NOT lie flat for at least 30 minutes after eating  Take medications as directed            Endocrine/Metabolic    Mixed hyperlipidemia     GOOD hyperlipidemia control  CONTINUE CURRENT THERAPY  Recommend low fat diet - Risks and potential complications of hyperlipidemia reviewed  Role of medications,diet, exercise in the treatment of hyperlipidemia discussed   Treatment plan and follow up reviewed and understood by patient         Relevant Orders    Comprehensive metabolic panel    Lipid panel       Other    Anxiety and depression     same meds - long term safety meds discussed           Other Visit Diagnoses     Non-recurrent acute serous otitis media of right ear        oral steroids   continue OTC allergy RX    Hair loss        check labs re metabolic causes SX    Relevant Orders    TSH 3rd Generation    T3    T4, free          Jennifer Moulton,   6/13/2020

## 2020-06-13 ENCOUNTER — TELEPHONE (OUTPATIENT)
Dept: FAMILY MEDICINE | Facility: CLINIC | Age: 60
End: 2020-06-13

## 2020-06-15 ENCOUNTER — OFFICE VISIT (OUTPATIENT)
Dept: FAMILY MEDICINE | Facility: CLINIC | Age: 60
End: 2020-06-15
Payer: COMMERCIAL

## 2020-06-15 ENCOUNTER — TELEPHONE (OUTPATIENT)
Dept: FAMILY MEDICINE | Facility: CLINIC | Age: 60
End: 2020-06-15

## 2020-06-15 VITALS
HEIGHT: 62 IN | TEMPERATURE: 97.5 F | DIASTOLIC BLOOD PRESSURE: 80 MMHG | SYSTOLIC BLOOD PRESSURE: 120 MMHG | BODY MASS INDEX: 27.23 KG/M2 | WEIGHT: 148 LBS | HEART RATE: 74 BPM | OXYGEN SATURATION: 98 %

## 2020-06-15 DIAGNOSIS — K21.9 GASTROESOPHAGEAL REFLUX DISEASE WITHOUT ESOPHAGITIS: ICD-10-CM

## 2020-06-15 DIAGNOSIS — H65.93 BILATERAL OTITIS MEDIA WITH EFFUSION: Primary | ICD-10-CM

## 2020-06-15 DIAGNOSIS — R07.9 CHEST PAIN, UNSPECIFIED TYPE: ICD-10-CM

## 2020-06-15 PROCEDURE — 93000 ELECTROCARDIOGRAM COMPLETE: CPT | Performed by: FAMILY MEDICINE

## 2020-06-15 PROCEDURE — 99213 OFFICE O/P EST LOW 20 MIN: CPT | Mod: 25 | Performed by: FAMILY MEDICINE

## 2020-06-15 RX ORDER — CLINDAMYCIN HYDROCHLORIDE 300 MG/1
300 CAPSULE ORAL 3 TIMES DAILY
Qty: 21 CAPSULE | Refills: 0 | Status: SHIPPED | OUTPATIENT
Start: 2020-06-15 | End: 2020-08-26

## 2020-06-15 ASSESSMENT — ENCOUNTER SYMPTOMS
SORE THROAT: 0
SINUS PAIN: 0
FATIGUE: 0
HEADACHES: 0
FEVER: 0
ADENOPATHY: 0
ABDOMINAL PAIN: 1
SHORTNESS OF BREATH: 0
SINUS PRESSURE: 0
TROUBLE SWALLOWING: 0
CHILLS: 0
COUGH: 0

## 2020-06-15 NOTE — PROGRESS NOTES
"Subjective      Patient ID: Lise Alonso is a 60 y.o. female.    HPI    \"spasms \" in chest after taking steroids        Able to eat without difficulty       Worse with lying flat  NOT taking PPI   NO SOB   Ear pain worse           The following have been reviewed and updated as appropriate in this visit:  Allergies  Meds  Problems       Review of Systems   Constitutional: Negative for chills, fatigue and fever.   HENT: Negative for ear pain, sinus pressure, sinus pain, sore throat and trouble swallowing.    Respiratory: Negative for cough and shortness of breath.    Cardiovascular: Negative for chest pain.   Gastrointestinal: Positive for abdominal pain (burning/ pain on direct palpation).   Skin: Negative for rash.   Neurological: Negative for headaches.   Hematological: Negative for adenopathy.       Current Outpatient Medications   Medication Sig Dispense Refill   • citalopram (celeXA) 40 mg tablet TAKE 1 TABLET BY MOUTH EVERY DAY 90 tablet 1   • FA/mv,Ca,iron,min/lycopene/lut (MULTIVITAL ORAL) No SIG Entered     • fexofenadine-pseudoephedrine (ALLEGRA-D 24 HOUR) 180-240 mg per 24 hr tablet take 1 tablet by oral route  every day on an empty stomach with a glass of water 90 tablet 1   • fluticasone (FLONASE ALLERGY RELIEF) 50 mcg/actuation nasal spray inhale 1 spray (50MCG)  by intranasal route  every day in each nostril     • lisinopril (PRINIVIL) 10 mg tablet TAKE 1 TABLET BY MOUTH EVERY DAY 90 tablet 1   • methylPREDNISolone (MEDROL DOSEPACK) 4 mg tablet Follow package directions. 21 tablet 0   • omega-3 fatty acids/fish oil (FISH OIL OMEGA 3-6-9 ORAL) No SIG Entered     • pantoprazole (PROTONIX) 40 mg EC tablet Take 40 mg by mouth.     • simvastatin (ZOCOR) 20 mg tablet TAKE 1 TABLET BY MOUTH NIGHTLY AS DIRECTED 90 tablet 1   • clindamycin (Cleocin HCL) 300 mg capsule Take 1 capsule (300 mg total) by mouth 3 (three) times a day for 7 days. 21 capsule 0     No current facility-administered medications for " this visit.      History reviewed. No pertinent past medical history.  Family History   Problem Relation Age of Onset   • Heart disease Biological Father    • Hyperlipidemia Biological Father      Past Surgical History:   Procedure Laterality Date   • CHOLECYSTECTOMY     • OOPHORECTOMY     • TUBAL LIGATION       Allergies   Allergen Reactions   • Amoxicillin    • Azithromycin    • Erythromycin Base    • Levofloxacin    • Penicillins    • Sulfanilamide    • Tetracycline      Social History     Socioeconomic History   • Marital status:      Spouse name: Not on file   • Number of children: Not on file   • Years of education: Not on file   • Highest education level: Not on file   Occupational History   • Not on file   Social Needs   • Financial resource strain: Not on file   • Food insecurity:     Worry: Not on file     Inability: Not on file   • Transportation needs:     Medical: Not on file     Non-medical: Not on file   Tobacco Use   • Smoking status: Never Smoker   • Smokeless tobacco: Never Used   Substance and Sexual Activity   • Alcohol use: Not on file   • Drug use: Not on file   • Sexual activity: Not on file   Lifestyle   • Physical activity:     Days per week: Not on file     Minutes per session: Not on file   • Stress: Not on file   Relationships   • Social connections:     Talks on phone: Not on file     Gets together: Not on file     Attends Alevism service: Not on file     Active member of club or organization: Not on file     Attends meetings of clubs or organizations: Not on file     Relationship status: Not on file   • Intimate partner violence:     Fear of current or ex partner: Not on file     Emotionally abused: Not on file     Physically abused: Not on file     Forced sexual activity: Not on file   Other Topics Concern   • Not on file   Social History Narrative   • Not on file     Vitals:    06/15/20 1201 06/15/20 1218   BP: 130/88 120/80   BP Location: Left upper arm Left upper arm  "  Patient Position: Sitting Sitting   Pulse: (!) 112 74   Temp: 36.4 °C (97.5 °F)    TempSrc: Tympanic    SpO2: 98%    Weight: 67.1 kg (148 lb)    Height: 1.575 m (5' 2\")        Objective     Physical Exam   Constitutional: She appears well-developed and well-nourished. No distress.   HENT:   Head: Normocephalic.   Right Ear: External ear normal. Tympanic membrane is erythematous. A middle ear effusion is present.   Left Ear: External ear normal. A middle ear effusion is present.   Nose: Nose normal.   Mouth/Throat: Oropharynx is clear and moist. No oropharyngeal exudate.   Neck: Normal range of motion. No thyromegaly present.   Cardiovascular: Normal rate, regular rhythm, normal heart sounds, intact distal pulses and normal pulses.   Pulmonary/Chest: Effort normal and breath sounds normal. No respiratory distress.   Abdominal: Soft. She exhibits no mass. There is no hepatosplenomegaly. There is tenderness in the epigastric area. There is no rebound and no guarding.   Lymphadenopathy:     She has cervical adenopathy.        Right cervical: Superficial cervical adenopathy present.        Left cervical: Superficial cervical adenopathy present.   Skin: Skin is warm and dry. No rash noted.       Assessment/Plan   Problem List Items Addressed This Visit        Digestive    Gastroesophageal reflux disease without esophagitis      Other Visit Diagnoses     Bilateral otitis media with effusion    -  Primary    ABX as directed    Chest pain, unspecified type        reassured EKG without acute changes    Relevant Orders    ECG 12 LEAD OFFICE PERFORMED (Completed)          Jennifer Moulton DO  6/15/2020  "

## 2020-06-15 NOTE — TELEPHONE ENCOUNTER
"Pt currently taking prednisone for fluid in ear. Woke up this AM with sweats and ear pain. Started with intermittent \"cramping\" over sternum approx 1 hour ago.  Denies SOB, jaw/arm pain, dizziness, palpitations, N/V. Pt wants in office eval. Scheduled for 2:50 today but pt requesting earlier. States she suffers from anxiety and thinks waiting will make things worse. Please advise.   "

## 2020-07-23 RX ORDER — LISINOPRIL 10 MG/1
TABLET ORAL
Qty: 90 TABLET | Refills: 1 | Status: SHIPPED | OUTPATIENT
Start: 2020-07-23 | End: 2021-01-15

## 2020-07-23 RX ORDER — CITALOPRAM 40 MG/1
TABLET, FILM COATED ORAL
Qty: 90 TABLET | Refills: 1 | Status: SHIPPED | OUTPATIENT
Start: 2020-07-23 | End: 2021-01-15

## 2020-08-26 ENCOUNTER — TELEPHONE (OUTPATIENT)
Dept: FAMILY MEDICINE | Facility: CLINIC | Age: 60
End: 2020-08-26

## 2020-08-26 RX ORDER — FEXOFENADINE HCL AND PSEUDOEPHEDRINE HCL 180; 240 MG/1; MG/1
TABLET, EXTENDED RELEASE ORAL
Qty: 90 TABLET | Refills: 1 | Status: SHIPPED | OUTPATIENT
Start: 2020-08-26 | End: 2021-03-01 | Stop reason: SDUPTHER

## 2020-08-26 NOTE — TELEPHONE ENCOUNTER
Last appt June 2020  Next appt Dec 2020    Patient requesting:    Allegra -D 24h    (Script because she doesn't have to show ID when script is sent directly from office)    [facility] - [phone]

## 2020-09-16 LAB
ALBUMIN SERPL-MCNC: 4.3 G/DL (ref 3.6–5.1)
ALBUMIN/GLOB SERPL: 2 (CALC) (ref 1–2.5)
ALP SERPL-CCNC: 61 U/L (ref 37–153)
ALT SERPL-CCNC: 24 U/L (ref 6–29)
AST SERPL-CCNC: 19 U/L (ref 10–35)
BASOPHILS # BLD AUTO: 80 CELLS/UL (ref 0–200)
BASOPHILS NFR BLD AUTO: 1.7 %
BILIRUB SERPL-MCNC: 0.4 MG/DL (ref 0.2–1.2)
BUN SERPL-MCNC: 13 MG/DL (ref 7–25)
BUN/CREAT SERPL: NORMAL (CALC) (ref 6–22)
CALCIUM SERPL-MCNC: 8.8 MG/DL (ref 8.6–10.4)
CHLORIDE SERPL-SCNC: 107 MMOL/L (ref 98–110)
CHOLEST SERPL-MCNC: 203 MG/DL
CHOLEST/HDLC SERPL: 4.5 (CALC)
CO2 SERPL-SCNC: 26 MMOL/L (ref 20–32)
CREAT SERPL-MCNC: 0.91 MG/DL (ref 0.5–0.99)
EOSINOPHIL # BLD AUTO: 169 CELLS/UL (ref 15–500)
EOSINOPHIL NFR BLD AUTO: 3.6 %
ERYTHROCYTE [DISTWIDTH] IN BLOOD BY AUTOMATED COUNT: 12.3 % (ref 11–15)
GLOBULIN SER CALC-MCNC: 2.2 G/DL (CALC) (ref 1.9–3.7)
GLUCOSE SERPL-MCNC: 85 MG/DL (ref 65–99)
HCT VFR BLD AUTO: 38.6 % (ref 35–45)
HDLC SERPL-MCNC: 45 MG/DL
HGB BLD-MCNC: 12.6 G/DL (ref 11.7–15.5)
LDLC SERPL CALC-MCNC: 115 MG/DL (CALC)
LYMPHOCYTES # BLD AUTO: 1354 CELLS/UL (ref 850–3900)
LYMPHOCYTES NFR BLD AUTO: 28.8 %
MCH RBC QN AUTO: 29.2 PG (ref 27–33)
MCHC RBC AUTO-ENTMCNC: 32.6 G/DL (ref 32–36)
MCV RBC AUTO: 89.4 FL (ref 80–100)
MONOCYTES # BLD AUTO: 428 CELLS/UL (ref 200–950)
MONOCYTES NFR BLD AUTO: 9.1 %
NEUTROPHILS # BLD AUTO: 2670 CELLS/UL (ref 1500–7800)
NEUTROPHILS NFR BLD AUTO: 56.8 %
NONHDLC SERPL-MCNC: 158 MG/DL (CALC)
PLATELET # BLD AUTO: 225 THOUSAND/UL (ref 140–400)
PMV BLD REES-ECKER: 10.8 FL (ref 7.5–12.5)
POTASSIUM SERPL-SCNC: 4.5 MMOL/L (ref 3.5–5.3)
PROT SERPL-MCNC: 6.5 G/DL (ref 6.1–8.1)
QUEST EGFR NON-AFR. AMERICAN: 68 ML/MIN/1.73M2
RBC # BLD AUTO: 4.32 MILLION/UL (ref 3.8–5.1)
SODIUM SERPL-SCNC: 140 MMOL/L (ref 135–146)
T3 SERPL-MCNC: 78 NG/DL (ref 76–181)
T4 FREE SERPL-MCNC: 0.9 NG/DL (ref 0.8–1.8)
TRIGL SERPL-MCNC: 324 MG/DL
TSH SERPL-ACNC: 1.45 MIU/L (ref 0.4–4.5)
WBC # BLD AUTO: 4.7 THOUSAND/UL (ref 3.8–10.8)

## 2020-11-06 ENCOUNTER — OFFICE VISIT (OUTPATIENT)
Dept: FAMILY MEDICINE | Facility: CLINIC | Age: 60
End: 2020-11-06
Payer: COMMERCIAL

## 2020-11-06 VITALS
DIASTOLIC BLOOD PRESSURE: 84 MMHG | SYSTOLIC BLOOD PRESSURE: 118 MMHG | OXYGEN SATURATION: 99 % | BODY MASS INDEX: 27.05 KG/M2 | HEART RATE: 80 BPM | TEMPERATURE: 98.5 F | WEIGHT: 147 LBS | HEIGHT: 62 IN

## 2020-11-06 DIAGNOSIS — R35.0 FREQUENCY OF URINATION: Primary | ICD-10-CM

## 2020-11-06 DIAGNOSIS — R10.32 LLQ PAIN: ICD-10-CM

## 2020-11-06 LAB
BILIRUBIN, POC: NEGATIVE
BLOOD URINE, POC: NEGATIVE
CLARITY, POC: CLEAR
COLOR, POC: YELLOW
GLUCOSE URINE, POC: NEGATIVE
KETONES, POC: NEGATIVE
LEUKOCYTE EST, POC: NEGATIVE
NITRITE, POC: NEGATIVE
PH, POC: 7
PROTEIN, POC: NEGATIVE
SPECIFIC GRAVITY, POC: 1
UROBILINOGEN, POC: 0.2

## 2020-11-06 PROCEDURE — 99213 OFFICE O/P EST LOW 20 MIN: CPT | Performed by: FAMILY MEDICINE

## 2020-11-06 PROCEDURE — 81002 URINALYSIS NONAUTO W/O SCOPE: CPT | Performed by: FAMILY MEDICINE

## 2020-11-06 RX ORDER — SIMVASTATIN 20 MG/1
TABLET, FILM COATED ORAL
Qty: 90 TABLET | Refills: 1 | Status: SHIPPED | OUTPATIENT
Start: 2020-11-06 | End: 2021-05-02

## 2020-11-06 RX ORDER — CHLORHEXIDINE GLUCONATE ORAL RINSE 1.2 MG/ML
SOLUTION DENTAL
COMMUNITY
Start: 2020-10-15 | End: 2020-12-14

## 2020-11-06 RX ORDER — NITROFURANTOIN 25; 75 MG/1; MG/1
100 CAPSULE ORAL 2 TIMES DAILY
Qty: 10 CAPSULE | Refills: 0 | Status: SHIPPED | OUTPATIENT
Start: 2020-11-06 | End: 2020-11-11

## 2020-11-06 ASSESSMENT — ENCOUNTER SYMPTOMS
DIZZINESS: 0
DIFFICULTY URINATING: 0
DYSURIA: 0
SHORTNESS OF BREATH: 0
ABDOMINAL DISTENTION: 0
ACTIVITY CHANGE: 0
PALPITATIONS: 0
ABDOMINAL PAIN: 1
COUGH: 0
FATIGUE: 0
HEMATURIA: 0
VOMITING: 0
APPETITE CHANGE: 0
FREQUENCY: 1
WHEEZING: 0
WEAKNESS: 0
NUMBNESS: 0
LIGHT-HEADEDNESS: 0
NAUSEA: 0
DIARRHEA: 0
CONSTIPATION: 1

## 2020-11-06 NOTE — PROGRESS NOTES
"      Subjective      Patient ID: Lise Alonso is a 60 y.o. female.  1960      HPI  Having pressure in left lower abdomen  Has history of constipation  Taking miralax  Had not gone for 3 days but had bowel movement last night and 3 times this am  No dysuria  But has pressure prior to urination  Up times last night to urinate  No vaginal discharge  Feels like mucous but when she wipes  No rash or itching  The following have been reviewed and updated as appropriate in this visit:  Allergies  Meds  Problems       Review of Systems   Constitutional: Negative for activity change, appetite change and fatigue.   Respiratory: Negative for cough, shortness of breath and wheezing.    Cardiovascular: Negative for chest pain, palpitations and leg swelling.   Gastrointestinal: Positive for abdominal pain (LLQ pain) and constipation. Negative for abdominal distention, diarrhea, nausea and vomiting.   Endocrine: Negative for cold intolerance and heat intolerance.   Genitourinary: Positive for frequency. Negative for difficulty urinating, dysuria, hematuria and vaginal discharge.   Skin: Negative for rash.   Neurological: Negative for dizziness, weakness, light-headedness and numbness.       Objective     Vitals:    11/06/20 1131   BP: 118/84   BP Location: Left upper arm   Patient Position: Sitting   Pulse: 80   Temp: 36.9 °C (98.5 °F)   TempSrc: Temporal   SpO2: 99%   Weight: 66.7 kg (147 lb)   Height: 1.575 m (5' 2.01\")     Body mass index is 26.88 kg/m².    Physical Exam  Vitals signs and nursing note reviewed.   Constitutional:       Appearance: She is well-developed.   Neck:      Vascular: No carotid bruit or JVD.   Cardiovascular:      Rate and Rhythm: Normal rate and regular rhythm.      Heart sounds: Normal heart sounds. No murmur.   Pulmonary:      Effort: Pulmonary effort is normal. No respiratory distress.      Breath sounds: Normal breath sounds. No wheezing.   Abdominal:      General: Bowel sounds are normal. " There is no distension.      Palpations: Abdomen is soft. There is no mass.      Tenderness: There is abdominal tenderness (minimal LLQ tenderness). There is no guarding or rebound.      Hernia: No hernia is present.   Musculoskeletal:         General: No tenderness.   Lymphadenopathy:      Cervical: No cervical adenopathy.   Skin:     General: Skin is warm and dry.   Neurological:      Mental Status: She is alert and oriented to person, place, and time.         Assessment/Plan   Diagnoses and all orders for this visit:    Frequency of urination (Primary)  Comments:  will give macrobid and check culture    Orders:  -     POCT urinalysis dipstick  -     Urine culture  -     nitrofurantoin, macrocrystal-monohydrate, (MACROBID) 100 mg capsule; Take 1 capsule (100 mg total) by mouth 2 (two) times a day for 5 days.    LLQ pain  Comments:  miralax  if pain increases to ER  Orders:  -     nitrofurantoin, macrocrystal-monohydrate, (MACROBID) 100 mg capsule; Take 1 capsule (100 mg total) by mouth 2 (two) times a day for 5 days.    Return if symptoms worsen or fail to improve.  Patient  agreeable with plan and verbalized understanding

## 2020-11-08 LAB
BACTERIA UR CULT: NO GROWTH
BACTERIA UR CULT: NORMAL

## 2020-12-14 ENCOUNTER — OFFICE VISIT (OUTPATIENT)
Dept: FAMILY MEDICINE | Facility: CLINIC | Age: 60
End: 2020-12-14
Payer: COMMERCIAL

## 2020-12-14 VITALS
BODY MASS INDEX: 26.87 KG/M2 | HEART RATE: 80 BPM | DIASTOLIC BLOOD PRESSURE: 70 MMHG | TEMPERATURE: 97.7 F | WEIGHT: 146 LBS | SYSTOLIC BLOOD PRESSURE: 124 MMHG | OXYGEN SATURATION: 97 % | HEIGHT: 62 IN

## 2020-12-14 DIAGNOSIS — Z82.49 FAMILY HISTORY OF CORONARY ARTERY DISEASE IN FATHER: ICD-10-CM

## 2020-12-14 DIAGNOSIS — E78.2 MIXED HYPERLIPIDEMIA: ICD-10-CM

## 2020-12-14 DIAGNOSIS — Z00.00 WELL WOMAN EXAM WITHOUT GYNECOLOGICAL EXAM: Primary | ICD-10-CM

## 2020-12-14 DIAGNOSIS — R07.89 ATYPICAL CHEST PAIN: ICD-10-CM

## 2020-12-14 DIAGNOSIS — K21.9 GASTROESOPHAGEAL REFLUX DISEASE WITHOUT ESOPHAGITIS: ICD-10-CM

## 2020-12-14 DIAGNOSIS — F41.9 ANXIETY AND DEPRESSION: ICD-10-CM

## 2020-12-14 DIAGNOSIS — J30.1 CHRONIC SEASONAL ALLERGIC RHINITIS DUE TO POLLEN: ICD-10-CM

## 2020-12-14 DIAGNOSIS — I10 ESSENTIAL HYPERTENSION: ICD-10-CM

## 2020-12-14 DIAGNOSIS — F32.A ANXIETY AND DEPRESSION: ICD-10-CM

## 2020-12-14 PROCEDURE — 99396 PREV VISIT EST AGE 40-64: CPT | Performed by: FAMILY MEDICINE

## 2020-12-14 PROCEDURE — 93000 ELECTROCARDIOGRAM COMPLETE: CPT | Performed by: FAMILY MEDICINE

## 2020-12-14 ASSESSMENT — ENCOUNTER SYMPTOMS
BACK PAIN: 0
BRUISES/BLEEDS EASILY: 0
FREQUENCY: 0
COUGH: 0
WEAKNESS: 0
CONSTIPATION: 0
VOICE CHANGE: 0
FEVER: 0
DIARRHEA: 0
LIGHT-HEADEDNESS: 0
ABDOMINAL PAIN: 1
SHORTNESS OF BREATH: 0
CHEST TIGHTNESS: 0
SEIZURES: 0
PALPITATIONS: 0
BLOOD IN STOOL: 0
SLEEP DISTURBANCE: 0
NAUSEA: 0
WHEEZING: 0
DYSURIA: 0
NERVOUS/ANXIOUS: 1
DIZZINESS: 0
FATIGUE: 0
HEADACHES: 0
CHILLS: 0
TREMORS: 0
SORE THROAT: 0
UNEXPECTED WEIGHT CHANGE: 0
STRIDOR: 0
ARTHRALGIAS: 0

## 2020-12-14 NOTE — PATIENT INSTRUCTIONS
Routine advice given on diet/exercise/weight  Recommend monthly self breast exams  Discussed colorectal screening  - colon current  Recommend calcium with D3  Immunization update discussed    RECHECK LABS / ECHO

## 2020-12-14 NOTE — PROGRESS NOTES
"Subjective      Patient ID: Lise Alonso is a 60 y.o. female.    HPI    Generally well  Following diet - mod exercise    CONCERNS :  Awakened this AM with \" sense of being on a ride\" - also sinus pressure                           GERD sx - better with PPI                           achiness left shoulder - afraid might be                                 Heart    PAP/ mammo dr Mota    The following have been reviewed and updated as appropriate in this visit:  Problems       Review of Systems   Constitutional: Negative for chills, fatigue, fever and unexpected weight change.   HENT: Negative for ear pain, hearing loss, sore throat, tinnitus and voice change.    Eyes: Negative for visual disturbance.   Respiratory: Negative for cough, chest tightness, shortness of breath, wheezing and stridor.    Cardiovascular: Negative for chest pain, palpitations and leg swelling.   Gastrointestinal: Positive for abdominal pain (GERD sx better ). Negative for blood in stool, constipation, diarrhea and nausea.   Endocrine: Negative for cold intolerance and heat intolerance.   Genitourinary: Negative for dysuria, frequency, pelvic pain, vaginal bleeding and vaginal discharge.   Musculoskeletal: Negative for arthralgias (knitting - left shouder), back pain and gait problem.   Neurological: Negative for dizziness, tremors, seizures, syncope, weakness, light-headedness and headaches.   Hematological: Does not bruise/bleed easily.   Psychiatric/Behavioral: Negative for sleep disturbance. The patient is nervous/anxious.        Current Outpatient Medications   Medication Sig Dispense Refill   • citalopram (celeXA) 40 mg tablet TAKE 1 TABLET BY MOUTH EVERY DAY 90 tablet 1   • FA/mv,Ca,iron,min/lycopene/lut (MULTIVITAL ORAL) No SIG Entered     • fexofenadine-pseudoephedrine (ALLEGRA-D 24 HOUR) 180-240 mg per 24 hr tablet take 1 tablet by oral route  every day on an empty stomach with a glass of water 90 tablet 1   • fluticasone (FLONASE " ALLERGY RELIEF) 50 mcg/actuation nasal spray inhale 1 spray (50MCG)  by intranasal route  every day in each nostril     • lisinopriL (PRINIVIL) 10 mg tablet TAKE 1 TABLET BY MOUTH EVERY DAY 90 tablet 1   • omega-3 fatty acids/fish oil (FISH OIL OMEGA 3-6-9 ORAL) No SIG Entered     • pantoprazole (PROTONIX) 40 mg EC tablet Take 40 mg by mouth.     • simvastatin (ZOCOR) 20 mg tablet TAKE 1 TABLET BY MOUTH NIGHTLY AS DIRECTED 90 tablet 1     No current facility-administered medications for this visit.      History reviewed. No pertinent past medical history.  Family History   Problem Relation Age of Onset   • Heart disease Biological Father    • Hyperlipidemia Biological Father      Past Surgical History:   Procedure Laterality Date   • CHOLECYSTECTOMY     • OOPHORECTOMY     • TUBAL LIGATION       Allergies   Allergen Reactions   • Amoxicillin    • Azithromycin    • Erythromycin Base    • Levofloxacin    • Penicillins    • Sulfanilamide    • Tetracycline      Social History     Socioeconomic History   • Marital status:      Spouse name: Not on file   • Number of children: Not on file   • Years of education: Not on file   • Highest education level: Not on file   Occupational History   • Not on file   Social Needs   • Financial resource strain: Not on file   • Food insecurity     Worry: Not on file     Inability: Not on file   • Transportation needs     Medical: Not on file     Non-medical: Not on file   Tobacco Use   • Smoking status: Never Smoker   • Smokeless tobacco: Never Used   Substance and Sexual Activity   • Alcohol use: Not on file   • Drug use: Not on file   • Sexual activity: Not on file   Lifestyle   • Physical activity     Days per week: Not on file     Minutes per session: Not on file   • Stress: Not on file   Relationships   • Social connections     Talks on phone: Not on file     Gets together: Not on file     Attends Christianity service: Not on file     Active member of club or organization: Not on  "file     Attends meetings of clubs or organizations: Not on file     Relationship status: Not on file   • Intimate partner violence     Fear of current or ex partner: Not on file     Emotionally abused: Not on file     Physically abused: Not on file     Forced sexual activity: Not on file   Other Topics Concern   • Not on file   Social History Narrative   • Not on file     Vitals:    12/14/20 1657 12/14/20 1718   BP: 116/72 124/70   BP Location: Left upper arm    Patient Position: Sitting    Pulse: 80    Temp: 36.5 °C (97.7 °F)    TempSrc: Temporal    SpO2: 97%    Weight: 66.2 kg (146 lb)    Height: 1.575 m (5' 2.01\")      Objective     Physical Exam  Constitutional:       Appearance: Normal appearance. She is well-developed.   HENT:      Head: Normocephalic.      Right Ear: Tympanic membrane normal.      Left Ear: Tympanic membrane normal.      Nose: Nose normal.      Mouth/Throat:      Mouth: Mucous membranes are moist.      Pharynx: Oropharynx is clear.   Eyes:      General: Lids are normal. Lids are everted, no foreign bodies appreciated.      Pupils: Pupils are equal, round, and reactive to light.      Funduscopic exam:     Right eye: No AV nicking.         Left eye: No AV nicking.   Neck:      Thyroid: No thyromegaly.      Vascular: No carotid bruit.   Cardiovascular:      Rate and Rhythm: Normal rate and regular rhythm.      Pulses: Normal pulses.      Heart sounds: Normal heart sounds. No murmur.   Pulmonary:      Effort: Pulmonary effort is normal.      Breath sounds: Normal breath sounds.   Abdominal:      General: Bowel sounds are normal. There is no abdominal bruit.      Palpations: Abdomen is soft. There is no hepatomegaly or splenomegaly.      Tenderness: There is no abdominal tenderness. There is no right CVA tenderness or left CVA tenderness.   Musculoskeletal:      Right lower leg: No edema.      Left lower leg: No edema.   Lymphadenopathy:      Cervical: No cervical adenopathy.      Upper Body:     "  Right upper body: No supraclavicular adenopathy.      Left upper body: No supraclavicular adenopathy.   Skin:     General: Skin is warm.      Findings: No rash.   Neurological:      Mental Status: She is alert and oriented to person, place, and time.   Psychiatric:         Mood and Affect: Mood is anxious.         Speech: Speech normal.         Behavior: Behavior normal.         Cognition and Memory: Cognition normal.         Assessment/Plan   Problem List Items Addressed This Visit        Respiratory    Chronic seasonal allergic rhinitis due to pollen     same meds - long term safety meds discussed            Circulatory    Essential hypertension     Hypertension well controlled  Recommend regular exercise and low salt diet  Risk and potential complications of hypertension discussed  Importance of medication compliance and regular follow up emphasized  Role of medication/diet/exercise in treating hypertension discussed  Treatment plan and follow up reviewed and understood by patient            Digestive    Gastroesophageal reflux disease without esophagitis     Patient was counselled regarding triggers for symptoms - avoid caffeine/spicey foods/NO smoking  Elevate HOB 30 degrees - DO NOT lie flat for at least 30 minutes after eating  Take medications as directed            Endocrine/Metabolic    Mixed hyperlipidemia     GOOD hyperlipidemia control  CONTINUE CURRENT THERAPY  Recommend low fat diet - Risks and potential complications of hyperlipidemia reviewed  Role of medications,diet, exercise in the treatment of hyperlipidemia discussed   Treatment plan and follow up reviewed and understood by patient         Relevant Orders    Lipid panel       Other    Anxiety and depression     Close follow for worsening SX  Same meds         Family history of coronary artery disease in father     Risk factor reduction discussed         Relevant Orders    ECG 12 LEAD OFFICE PERFORMED (Completed)    Transthoracic echo (TTE)  complete      Other Visit Diagnoses     Well woman exam without gynecological exam    -  Primary    Atypical chest pain        counseled re EKG normal  sx atypical for cardiac disease - further work up to r/u problem    Relevant Orders    ECG 12 LEAD OFFICE PERFORMED (Completed)    Transthoracic echo (TTE) complete          Jennifer Moulton,   12/17/2020

## 2020-12-21 ENCOUNTER — TELEPHONE (OUTPATIENT)
Dept: FAMILY MEDICINE | Facility: CLINIC | Age: 60
End: 2020-12-21

## 2020-12-21 RX ORDER — CLINDAMYCIN HYDROCHLORIDE 300 MG/1
300 CAPSULE ORAL 3 TIMES DAILY
Qty: 21 CAPSULE | Refills: 0 | Status: SHIPPED | OUTPATIENT
Start: 2020-12-21 | End: 2020-12-28

## 2020-12-21 NOTE — TELEPHONE ENCOUNTER
Pt states she was in to see you for PE 12/14 and you noticed fluid behind R ear. Pt now with sinus congestion/pressure and L ear pain. No drainage noted. Pt on Allegra-D and Flonase and not improving. Pt requesting abx Clindamycin be sent in if possible to [facility]

## 2020-12-31 ENCOUNTER — HOSPITAL ENCOUNTER (OUTPATIENT)
Dept: CARDIOLOGY | Age: 60
Discharge: HOME | End: 2020-12-31
Attending: FAMILY MEDICINE
Payer: COMMERCIAL

## 2020-12-31 VITALS
DIASTOLIC BLOOD PRESSURE: 70 MMHG | HEIGHT: 62 IN | BODY MASS INDEX: 26.87 KG/M2 | WEIGHT: 146 LBS | SYSTOLIC BLOOD PRESSURE: 124 MMHG

## 2020-12-31 DIAGNOSIS — R07.89 ATYPICAL CHEST PAIN: ICD-10-CM

## 2020-12-31 DIAGNOSIS — Z82.49 FAMILY HISTORY OF CORONARY ARTERY DISEASE IN FATHER: ICD-10-CM

## 2020-12-31 LAB
AORTIC ROOT ANNULUS: 2.9 CM
ASCENDING AORTA: 3 CM
BSA FOR ECHO PROCEDURE: 1.7 M2
E WAVE DECELERATION TIME: 253 MS
E/A RATIO: 0.9
E/E' RATIO: 11.3
E/LAT E' RATIO: 7.8
EDV (BP): 31.7 CM3
EF (A4C): 72.8 %
EF A2C: 65.4 %
EJECTION FRACTION: 69.3 %
EST RIGHT VENT SYSTOLIC PRESSURE BY TRICUSPID REGURGITATION JET: 15 MMHG
ESV (BP): 9.74 CM3
FRACTIONAL SHORTENING: 33.6 %
INTERVENTRICULAR SEPTUM: 0.89 CM
LA ESV (BP): 15.3 CM3
LA ESV INDEX (A2C): 12.71 CM3/M2
LA ESV INDEX (BP): 9 CM3/M2
LA/AORTA RATIO: 0.93
LAAS-AP2: 10.2 CM2
LAAS-AP4: 7.18 CM2
LAD 2D: 2.7 CM
LALD A4C: 3.73 CM
LALD A4C: 3.83 CM
LAV-S: 21.6 CM3
LEFT ATRIUM VOLUME INDEX: 6.35 CM3/M2
LEFT ATRIUM VOLUME: 10.8 CM3
LEFT INTERNAL DIMENSION IN SYSTOLE: 2.33 CM (ref 2.35–3.56)
LEFT VENTRICLE DIASTOLIC VOLUME INDEX: 17.82 CM3/M2
LEFT VENTRICLE DIASTOLIC VOLUME: 30.3 CM3
LEFT VENTRICLE SYSTOLIC VOLUME INDEX: 4.85 CM3/M2
LEFT VENTRICLE SYSTOLIC VOLUME: 8.25 CM3
LEFT VENTRICULAR INTERNAL DIMENSION IN DIASTOLE: 3.51 CM (ref 3.97–5.51)
LEFT VENTRICULAR POSTERIOR WALL IN END DIASTOLE: 0.87 CM (ref 0.52–0.96)
LV DIASTOLIC VOLUME: 33.2 CM3
LV ESV (APICAL 2 CHAMBER): 11.5 CM3
LVAD-AP2: 16.2 CM2
LVAD-AP4: 15.4 CM2
LVAS-AP2: 8.06 CM2
LVAS-AP4: 7.05 CM2
LVEDVI(A2C): 19.53 CM3/M2
LVEDVI(BP): 18.65 CM3/M2
LVESVI(A2C): 6.76 CM3/M2
LVESVI(BP): 5.73 CM3/M2
LVLD-AP2: 6.51 CM
LVLD-AP4: 6.48 CM
LVLS-AP2: 5.21 CM
LVLS-AP4: 5.26 CM
MV E'TISSUE VEL-LAT: 0.09 M/S
MV E'TISSUE VEL-MED: 0.06 M/S
MV PEAK A VEL: 0.82 M/S
MV PEAK E VEL: 0.7 M/S
POSTERIOR WALL: 0.87 CM
TR MAX PG: 10 MMHG
TRICUSPID VALVE PEAK REGURGITATION VELOCITY: 1.57 M/S
Z-SCORE OF LEFT VENTRICULAR DIMENSION IN END DIASTOLE: -2.86
Z-SCORE OF LEFT VENTRICULAR DIMENSION IN END SYSTOLE: -1.72
Z-SCORE OF LEFT VENTRICULAR POSTERIOR WALL IN END DIASTOLE: 1.11

## 2020-12-31 PROCEDURE — 93306 TTE W/DOPPLER COMPLETE: CPT

## 2021-01-15 RX ORDER — CITALOPRAM 40 MG/1
TABLET, FILM COATED ORAL
Qty: 90 TABLET | Refills: 1 | Status: SHIPPED | OUTPATIENT
Start: 2021-01-15 | End: 2021-06-21 | Stop reason: SDUPTHER

## 2021-01-15 RX ORDER — LISINOPRIL 10 MG/1
TABLET ORAL
Qty: 90 TABLET | Refills: 1 | Status: SHIPPED | OUTPATIENT
Start: 2021-01-15 | End: 2021-06-21 | Stop reason: SDUPTHER

## 2021-01-29 DIAGNOSIS — Z23 ENCOUNTER FOR IMMUNIZATION: ICD-10-CM

## 2021-03-01 RX ORDER — FEXOFENADINE HYDROCHLORIDE AND PSEUDOEPHEDRINE HYDROCHLORIDE 180; 240 MG/1; MG/1
TABLET, FILM COATED, EXTENDED RELEASE ORAL
Qty: 90 TABLET | Refills: 1 | Status: SHIPPED | OUTPATIENT
Start: 2021-03-01 | End: 2022-06-01 | Stop reason: SDUPTHER

## 2021-03-01 NOTE — TELEPHONE ENCOUNTER
Medicine Refill Request    Query RX allegra D    Last Office Visit: 12/14/2020  Last Telemedicine Visit: Visit date not found    Next Office Visit: 6/21/2021  Next Telemedicine Visit: Visit date not found         Current Outpatient Medications:   •  citalopram (celeXA) 40 mg tablet, TAKE 1 TABLET BY MOUTH EVERY DAY, Disp: 90 tablet, Rfl: 1  •  FA/mv,Ca,iron,min/lycopene/lut (MULTIVITAL ORAL), No SIG Entered, Disp: , Rfl:   •  fexofenadine-pseudoephedrine (ALLEGRA-D 24 HOUR) 180-240 mg per 24 hr tablet, take 1 tablet by oral route  every day on an empty stomach with a glass of water, Disp: 90 tablet, Rfl: 1  •  fluticasone (FLONASE ALLERGY RELIEF) 50 mcg/actuation nasal spray, inhale 1 spray (50MCG)  by intranasal route  every day in each nostril, Disp: , Rfl:   •  lisinopriL (PRINIVIL) 10 mg tablet, TAKE 1 TABLET BY MOUTH EVERY DAY, Disp: 90 tablet, Rfl: 1  •  omega-3 fatty acids/fish oil (FISH OIL OMEGA 3-6-9 ORAL), No SIG Entered, Disp: , Rfl:   •  pantoprazole (PROTONIX) 40 mg EC tablet, Take 40 mg by mouth., Disp: , Rfl:   •  simvastatin (ZOCOR) 20 mg tablet, TAKE 1 TABLET BY MOUTH NIGHTLY AS DIRECTED, Disp: 90 tablet, Rfl: 1      BP Readings from Last 3 Encounters:   12/31/20 124/70   12/14/20 124/70   11/06/20 118/84       Recent Lab results:  Lab Results   Component Value Date    CHOL 203 (H) 09/15/2020   ,   Lab Results   Component Value Date    HDL 45 (L) 09/15/2020   ,   Lab Results   Component Value Date    LDLCALC 115 (H) 09/15/2020   ,   Lab Results   Component Value Date    TRIG 324 (H) 09/15/2020        Lab Results   Component Value Date    GLUCOSE 85 09/15/2020   , No results found for: HGBA1C      Lab Results   Component Value Date    CREATININE 0.91 09/15/2020       Lab Results   Component Value Date    TSH 1.45 09/15/2020

## 2021-04-08 ENCOUNTER — TELEPHONE (OUTPATIENT)
Dept: FAMILY MEDICINE | Facility: CLINIC | Age: 61
End: 2021-04-08

## 2021-04-08 NOTE — TELEPHONE ENCOUNTER
VERIFY IF PT HAD COVID VACCINE AT SAME TIME AS   IF YES TAKING STEROIDS WITHIN 2 WEEKS OF VACCINE CAN LESSEN IMMUNITY  CAN CALL IN STEROID ON MONDAY

## 2021-04-08 NOTE — TELEPHONE ENCOUNTER
Pt called with Continued R ear pressure, fullness, and discomfort. Pt currently with COVID at home. On Allegra-D and mucinex. R ear issue has been going on for about a month prior to COVID infection.    Can steroid or RX  Be sent to pharmacy to help with R ear issue?

## 2021-04-08 NOTE — TELEPHONE ENCOUNTER
Spoke with patient. She did get vaccine same day as . She will call office on Monday if symptoms still persist ans needs Rx at that point

## 2021-04-13 ENCOUNTER — TELEPHONE (OUTPATIENT)
Dept: FAMILY MEDICINE | Facility: CLINIC | Age: 61
End: 2021-04-13

## 2021-04-13 RX ORDER — CLINDAMYCIN HYDROCHLORIDE 300 MG/1
300 CAPSULE ORAL 3 TIMES DAILY
Qty: 21 CAPSULE | Refills: 0 | Status: SHIPPED | OUTPATIENT
Start: 2021-04-13 | End: 2021-04-20

## 2021-04-13 NOTE — TELEPHONE ENCOUNTER
Pt called with c/o of continued ear pain and now with dark yellow discharge out of nose. Pt requesting abx from Dr. Madrigal (pt > 10 days post COVID dx).    See TC noted about symptoms 4/8

## 2021-04-20 DIAGNOSIS — E78.2 MIXED HYPERLIPIDEMIA: Primary | ICD-10-CM

## 2021-05-02 RX ORDER — SIMVASTATIN 20 MG/1
TABLET, FILM COATED ORAL
Qty: 90 TABLET | Refills: 1 | Status: SHIPPED | OUTPATIENT
Start: 2021-05-02 | End: 2021-06-21 | Stop reason: SDUPTHER

## 2021-06-18 LAB
CHOLEST SERPL-MCNC: 210 MG/DL
CHOLEST/HDLC SERPL: 4.1 (CALC)
HDLC SERPL-MCNC: 51 MG/DL
LDLC SERPL CALC-MCNC: 120 MG/DL (CALC)
NONHDLC SERPL-MCNC: 159 MG/DL (CALC)
TRIGL SERPL-MCNC: 275 MG/DL

## 2021-06-21 ENCOUNTER — OFFICE VISIT (OUTPATIENT)
Dept: FAMILY MEDICINE | Facility: CLINIC | Age: 61
End: 2021-06-21
Payer: COMMERCIAL

## 2021-06-21 VITALS
TEMPERATURE: 98.4 F | BODY MASS INDEX: 26.87 KG/M2 | WEIGHT: 146 LBS | HEART RATE: 83 BPM | DIASTOLIC BLOOD PRESSURE: 80 MMHG | OXYGEN SATURATION: 99 % | RESPIRATION RATE: 18 BRPM | HEIGHT: 62 IN | SYSTOLIC BLOOD PRESSURE: 128 MMHG

## 2021-06-21 DIAGNOSIS — I10 ESSENTIAL HYPERTENSION: Primary | ICD-10-CM

## 2021-06-21 DIAGNOSIS — D17.20 LIPOMA OF UPPER ARM: ICD-10-CM

## 2021-06-21 DIAGNOSIS — E78.2 MIXED HYPERLIPIDEMIA: ICD-10-CM

## 2021-06-21 DIAGNOSIS — K21.9 GASTROESOPHAGEAL REFLUX DISEASE WITHOUT ESOPHAGITIS: ICD-10-CM

## 2021-06-21 DIAGNOSIS — J30.1 CHRONIC SEASONAL ALLERGIC RHINITIS DUE TO POLLEN: ICD-10-CM

## 2021-06-21 PROCEDURE — 3074F SYST BP LT 130 MM HG: CPT | Performed by: FAMILY MEDICINE

## 2021-06-21 PROCEDURE — 99214 OFFICE O/P EST MOD 30 MIN: CPT | Performed by: FAMILY MEDICINE

## 2021-06-21 PROCEDURE — 3079F DIAST BP 80-89 MM HG: CPT | Performed by: FAMILY MEDICINE

## 2021-06-21 PROCEDURE — 3008F BODY MASS INDEX DOCD: CPT | Performed by: FAMILY MEDICINE

## 2021-06-21 RX ORDER — CITALOPRAM 40 MG/1
TABLET, FILM COATED ORAL
Qty: 90 TABLET | Refills: 1 | Status: SHIPPED | OUTPATIENT
Start: 2021-06-21 | End: 2022-01-01

## 2021-06-21 RX ORDER — SIMVASTATIN 20 MG/1
TABLET, FILM COATED ORAL
Qty: 90 TABLET | Refills: 1 | Status: SHIPPED | OUTPATIENT
Start: 2021-06-21 | End: 2022-03-31 | Stop reason: SDUPTHER

## 2021-06-21 RX ORDER — LISINOPRIL 10 MG/1
10 TABLET ORAL
Qty: 90 TABLET | Refills: 1 | Status: SHIPPED | OUTPATIENT
Start: 2021-06-21 | End: 2022-01-01

## 2021-06-21 ASSESSMENT — ENCOUNTER SYMPTOMS
PALPITATIONS: 0
ARTHRALGIAS: 0
HEMATURIA: 0
BRUISES/BLEEDS EASILY: 0
COLOR CHANGE: 0
BLOOD IN STOOL: 0
HEADACHES: 0
ABDOMINAL PAIN: 1
NUMBNESS: 0
FATIGUE: 0
DIARRHEA: 0
DIZZINESS: 0
NAUSEA: 0
ADENOPATHY: 0
UNEXPECTED WEIGHT CHANGE: 0
SHORTNESS OF BREATH: 0
COUGH: 0
DYSPHORIC MOOD: 0
DYSURIA: 0
SLEEP DISTURBANCE: 0
FREQUENCY: 0
CHILLS: 0
CONSTIPATION: 0

## 2021-06-21 NOTE — PROGRESS NOTES
Subjective      Patient ID: Kolby Alonso is a 61 y.o. female.    HPI      Medical management of hypertension, hyperlipidemia  Pre visit care team analia performed  Following diet - walking for exercise  Check lump arm - 6 months/ not changing    The following have been reviewed and updated as appropriate in this visit:  Allergies  Meds  Problems       Review of Systems   Constitutional: Negative for chills, fatigue and unexpected weight change.   Eyes: Negative for visual disturbance (recent exam).   Respiratory: Negative for cough and shortness of breath.    Cardiovascular: Negative for chest pain, palpitations and leg swelling.   Gastrointestinal: Positive for abdominal pain (GERD SX - restarted PPI). Negative for blood in stool, constipation, diarrhea and nausea.   Endocrine: Negative for cold intolerance and heat intolerance.   Genitourinary: Negative for dysuria, frequency, hematuria and urgency.   Musculoskeletal: Negative for arthralgias.   Skin: Negative for color change.   Neurological: Negative for dizziness, numbness and headaches.   Hematological: Negative for adenopathy. Does not bruise/bleed easily.   Psychiatric/Behavioral: Negative for dysphoric mood and sleep disturbance.       Current Outpatient Medications   Medication Sig Dispense Refill   • citalopram (celeXA) 40 mg tablet TAKE 1 TABLET BY MOUTH EVERY DAY 90 tablet 1   • FA/mv,Ca,iron,min/lycopene/lut (MULTIVITAL ORAL) No SIG Entered     • fexofenadine-pseudoephedrine (ALLEGRA-D 24 HOUR) 180-240 mg per 24 hr tablet take 1 tablet by oral route  every day on an empty stomach with a glass of water 90 tablet 1   • fluticasone (FLONASE ALLERGY RELIEF) 50 mcg/actuation nasal spray inhale 1 spray (50MCG)  by intranasal route  every day in each nostril     • lisinopriL (PRINIVIL) 10 mg tablet Take 1 tablet (10 mg total) by mouth once daily. 90 tablet 1   • omega-3 fatty acids/fish oil (FISH OIL OMEGA 3-6-9 ORAL) No SIG Entered     • pantoprazole  (PROTONIX) 40 mg EC tablet Take 40 mg by mouth.     • simvastatin (ZOCOR) 20 mg tablet TAKE 1 TABLET BY MOUTH NIGHTLY AS DIRECTED 90 tablet 1     No current facility-administered medications for this visit.     History reviewed. No pertinent past medical history.  Family History   Problem Relation Age of Onset   • Heart disease Biological Father    • Hyperlipidemia Biological Father      Past Surgical History:   Procedure Laterality Date   • CHOLECYSTECTOMY     • OOPHORECTOMY     • TUBAL LIGATION       Allergies   Allergen Reactions   • Amoxicillin    • Azithromycin    • Erythromycin Base    • Levofloxacin    • Penicillins    • Sulfanilamide    • Tetracycline      Social History     Socioeconomic History   • Marital status:      Spouse name: Not on file   • Number of children: Not on file   • Years of education: Not on file   • Highest education level: Not on file   Occupational History   • Not on file   Tobacco Use   • Smoking status: Never Smoker   • Smokeless tobacco: Never Used   Substance and Sexual Activity   • Alcohol use: Not on file   • Drug use: Not on file   • Sexual activity: Not on file   Other Topics Concern   • Not on file   Social History Narrative   • Not on file     Social Determinants of Health     Financial Resource Strain:    • Difficulty of Paying Living Expenses:    Food Insecurity:    • Worried About Running Out of Food in the Last Year:    • Ran Out of Food in the Last Year:    Transportation Needs:    • Lack of Transportation (Medical):    • Lack of Transportation (Non-Medical):    Physical Activity:    • Days of Exercise per Week:    • Minutes of Exercise per Session:    Stress:    • Feeling of Stress :    Social Connections:    • Frequency of Communication with Friends and Family:    • Frequency of Social Gatherings with Friends and Family:    • Attends [employer] Services:    • Active Member of Clubs or Organizations:    • Attends Club or Organization Meetings:    • Marital Status:  "   Intimate Partner Violence:    • Fear of Current or Ex-Partner:    • Emotionally Abused:    • Physically Abused:    • Sexually Abused:      Vitals:    06/21/21 1734 06/21/21 1750   BP: 128/82 128/80   BP Location: Left upper arm    Patient Position: Sitting    Pulse: 83    Resp: 18    Temp: 36.9 °C (98.4 °F)    TempSrc: Oral    SpO2: 99%    Weight: 66.2 kg (146 lb)    Height: 1.575 m (5' 2\")        Objective     Physical Exam  Constitutional:       Appearance: Normal appearance. She is well-developed.   HENT:      Head: Normocephalic.      Right Ear: Tympanic membrane normal.      Left Ear: Tympanic membrane normal.      Nose: Nose normal.      Mouth/Throat:      Mouth: Mucous membranes are moist.      Pharynx: Oropharynx is clear.   Eyes:      Pupils: Pupils are equal, round, and reactive to light.      Funduscopic exam:     Right eye: No AV nicking.         Left eye: No AV nicking.   Neck:      Thyroid: No thyromegaly.      Vascular: No carotid bruit.   Cardiovascular:      Rate and Rhythm: Normal rate and regular rhythm.      Pulses: Normal pulses.           Dorsalis pedis pulses are 2+ on the right side and 2+ on the left side.        Posterior tibial pulses are 2+ on the right side and 2+ on the left side.      Heart sounds: No murmur heard.     Pulmonary:      Effort: Pulmonary effort is normal.      Breath sounds: Normal breath sounds.   Abdominal:      General: Bowel sounds are normal.      Palpations: Abdomen is soft. There is no hepatomegaly or splenomegaly.      Tenderness: There is no abdominal tenderness. There is no right CVA tenderness or left CVA tenderness.   Musculoskeletal:      Right lower leg: No edema.      Left lower leg: No edema.   Lymphadenopathy:      Cervical: No cervical adenopathy.      Upper Body:      Right upper body: No supraclavicular adenopathy.      Left upper body: No supraclavicular adenopathy.   Skin:     General: Skin is warm and dry.      Findings: No rash.      Comments: " Soft / freely moveable mass forearm   non tender   Neurological:      Mental Status: She is alert and oriented to person, place, and time.      Sensory: No sensory deficit.   Psychiatric:         Mood and Affect: Mood normal.         Speech: Speech normal.         Behavior: Behavior normal.         Assessment/Plan   Problem List Items Addressed This Visit        Respiratory    Chronic seasonal allergic rhinitis due to pollen     same meds - long term safety meds discussed            Circulatory    Essential hypertension - Primary     Hypertension well controlled    /80 - goal < 130/80  Recommend regular exercise and low salt diet  Risk and potential complications of hypertension discussed  Importance of medication compliance and regular follow up emphasized  Role of medication/diet/exercise in treating hypertension discussed  Treatment plan and follow up reviewed and understood by patient         Relevant Medications    lisinopriL (PRINIVIL) 10 mg tablet       Digestive    Gastroesophageal reflux disease without esophagitis     Patient was counselled regarding triggers for symptoms - avoid caffeine/spicey foods/NO smoking  Elevate HOB 30 degrees - DO NOT lie flat for at least 30 minutes after eating  Take medications as directed            Endocrine/Metabolic    Mixed hyperlipidemia     FAIR hyperlipidemia control      - goal < 100  CONTINUE CURRENT THERAPY  Recommend low fat diet - Risks and potential complications of hyperlipidemia reviewed  Role of medications,diet, exercise in the treatment of hyperlipidemia discussed   Treatment plan and follow up reviewed and understood by patient         Relevant Medications    simvastatin (ZOCOR) 20 mg tablet      Other Visit Diagnoses     Lipoma of upper arm        observe - alarm sign changes discussed          Jennifer Moulton DO  6/26/2021

## 2021-06-21 NOTE — PATIENT INSTRUCTIONS
SAME MEDS  KEEP UP THE GOOD WORK !!    OBSERVE LIPOMA FOR CHANGE IN SIZE                                             CHANGE IN MOBILITY                                             CHANGE IN TEXTURE

## 2021-06-26 NOTE — ASSESSMENT & PLAN NOTE
FAIR hyperlipidemia control      - goal < 100  CONTINUE CURRENT THERAPY  Recommend low fat diet - Risks and potential complications of hyperlipidemia reviewed  Role of medications,diet, exercise in the treatment of hyperlipidemia discussed   Treatment plan and follow up reviewed and understood by patient

## 2021-12-06 ENCOUNTER — OFFICE VISIT (OUTPATIENT)
Dept: FAMILY MEDICINE | Facility: CLINIC | Age: 61
End: 2021-12-06
Payer: COMMERCIAL

## 2021-12-06 VITALS
OXYGEN SATURATION: 99 % | DIASTOLIC BLOOD PRESSURE: 80 MMHG | BODY MASS INDEX: 27.2 KG/M2 | SYSTOLIC BLOOD PRESSURE: 132 MMHG | WEIGHT: 147.8 LBS | HEART RATE: 89 BPM | RESPIRATION RATE: 16 BRPM | TEMPERATURE: 98.4 F | HEIGHT: 62 IN

## 2021-12-06 DIAGNOSIS — E78.2 MIXED HYPERLIPIDEMIA: ICD-10-CM

## 2021-12-06 DIAGNOSIS — K21.9 GASTROESOPHAGEAL REFLUX DISEASE WITHOUT ESOPHAGITIS: ICD-10-CM

## 2021-12-06 DIAGNOSIS — F41.9 ANXIETY AND DEPRESSION: ICD-10-CM

## 2021-12-06 DIAGNOSIS — Z82.49 FAMILY HISTORY OF CORONARY ARTERY DISEASE IN FATHER: ICD-10-CM

## 2021-12-06 DIAGNOSIS — I10 ESSENTIAL HYPERTENSION: Primary | ICD-10-CM

## 2021-12-06 DIAGNOSIS — F32.A ANXIETY AND DEPRESSION: ICD-10-CM

## 2021-12-06 PROCEDURE — 3075F SYST BP GE 130 - 139MM HG: CPT | Performed by: FAMILY MEDICINE

## 2021-12-06 PROCEDURE — 3079F DIAST BP 80-89 MM HG: CPT | Performed by: FAMILY MEDICINE

## 2021-12-06 PROCEDURE — 3008F BODY MASS INDEX DOCD: CPT | Performed by: FAMILY MEDICINE

## 2021-12-06 PROCEDURE — 99214 OFFICE O/P EST MOD 30 MIN: CPT | Performed by: FAMILY MEDICINE

## 2021-12-06 ASSESSMENT — ENCOUNTER SYMPTOMS
DIARRHEA: 0
COUGH: 0
ABDOMINAL PAIN: 1
WHEEZING: 0
CHILLS: 0
VOICE CHANGE: 0
ROS SKIN COMMENTS: SKIN TAGS
UNEXPECTED WEIGHT CHANGE: 0
BRUISES/BLEEDS EASILY: 0
STRIDOR: 0
BACK PAIN: 0
DIZZINESS: 0
CHEST TIGHTNESS: 0
TREMORS: 0
DYSPHORIC MOOD: 0
NAUSEA: 0
SHORTNESS OF BREATH: 0
LIGHT-HEADEDNESS: 0
CONSTIPATION: 0
SORE THROAT: 0
WEAKNESS: 0
BLOOD IN STOOL: 0
FATIGUE: 0
PALPITATIONS: 0
FEVER: 0
DYSURIA: 0
FREQUENCY: 0
SLEEP DISTURBANCE: 0
ARTHRALGIAS: 0
SEIZURES: 0
HEADACHES: 0

## 2021-12-06 NOTE — PROGRESS NOTES
Subjective      Patient ID: Kolby Alonso is a 61 y.o. female.    HPI     Medical management of  hypertension, hyperlipidemia  Pre visit care team analia performed  Plans more exercise when ankle brace off  Following diet    NO concerns - lipoma arm ? size    The following have been reviewed and updated as appropriate in this visit:   Allergies  Meds  Problems       Review of Systems   Constitutional: Negative for chills, fatigue, fever and unexpected weight change.   HENT: Negative for ear pain, hearing loss, sore throat, tinnitus and voice change.    Eyes: Negative for visual disturbance (exam current).   Respiratory: Negative for cough, chest tightness, shortness of breath, wheezing and stridor.    Cardiovascular: Negative for chest pain, palpitations and leg swelling.   Gastrointestinal: Positive for abdominal pain (controlled with [id number]). Negative for blood in stool, constipation, diarrhea and nausea.   Endocrine: Negative for cold intolerance and heat intolerance.   Genitourinary: Negative for dysuria, frequency, pelvic pain, vaginal bleeding and vaginal discharge.   Musculoskeletal: Negative for arthralgias, back pain and gait problem.   Skin:        Skin tags   Neurological: Negative for dizziness, tremors, seizures, syncope, weakness, light-headedness and headaches.   Hematological: Does not bruise/bleed easily.   Psychiatric/Behavioral: Negative for dysphoric mood and sleep disturbance.       Current Outpatient Medications   Medication Sig Dispense Refill   • citalopram (celeXA) 40 mg tablet TAKE 1 TABLET BY MOUTH EVERY DAY 90 tablet 1   • FA/mv,Ca,iron,min/lycopene/lut (MULTIVITAL ORAL) No SIG Entered     • fexofenadine-pseudoephedrine (ALLEGRA-D 24 HOUR) 180-240 mg per 24 hr tablet take 1 tablet by oral route  every day on an empty stomach with a glass of water 90 tablet 1   • fluticasone (FLONASE ALLERGY RELIEF) 50 mcg/actuation nasal spray inhale 1 spray (50MCG)  by intranasal route  every day in  each nostril     • lisinopriL (PRINIVIL) 10 mg tablet Take 1 tablet (10 mg total) by mouth once daily. 90 tablet 1   • omega-3 fatty acids/fish oil (FISH OIL OMEGA 3-6-9 ORAL) No SIG Entered     • pantoprazole (PROTONIX) 40 mg EC tablet Take 40 mg by mouth.     • simvastatin (ZOCOR) 20 mg tablet TAKE 1 TABLET BY MOUTH NIGHTLY AS DIRECTED 90 tablet 1     No current facility-administered medications for this visit.     History reviewed. No pertinent past medical history.  Family History   Problem Relation Age of Onset   • Heart disease Biological Father    • Hyperlipidemia Biological Father      Past Surgical History:   Procedure Laterality Date   • CHOLECYSTECTOMY     • OOPHORECTOMY     • TUBAL LIGATION       Allergies   Allergen Reactions   • Amoxicillin    • Azithromycin    • Erythromycin Base    • Levofloxacin    • Penicillins    • Sulfanilamide    • Tetracycline      Social History     Socioeconomic History   • Marital status:      Spouse name: Not on file   • Number of children: Not on file   • Years of education: Not on file   • Highest education level: Not on file   Occupational History   • Not on file   Tobacco Use   • Smoking status: Never Smoker   • Smokeless tobacco: Never Used   Substance and Sexual Activity   • Alcohol use: Not on file   • Drug use: Not on file   • Sexual activity: Not on file   Other Topics Concern   • Not on file   Social History Narrative   • Not on file     Social Determinants of Health     Financial Resource Strain: Not on file   Food Insecurity: Not on file   Transportation Needs: Not on file   Physical Activity: Not on file   Stress: Not on file   Social Connections: Not on file   Intimate Partner Violence: Not on file   Housing Stability: Not on file     Vitals:    12/06/21 1733   BP: 132/80   BP Location: Left upper arm   Patient Position: Sitting   Pulse: 89   Resp: 16   Temp: 36.9 °C (98.4 °F)   TempSrc: Oral   SpO2: 99%   Weight: 67 kg (147 lb 12.8 oz)   Height: 1.575  "m (5' 2\")       Objective     Physical Exam  Constitutional:       Appearance: Normal appearance. She is well-developed.   Eyes:      General: Lids are normal. Lids are everted, no foreign bodies appreciated.      Pupils: Pupils are equal, round, and reactive to light.      Funduscopic exam:     Right eye: No AV nicking.         Left eye: No AV nicking.   Neck:      Thyroid: No thyromegaly.      Vascular: No carotid bruit.   Cardiovascular:      Rate and Rhythm: Normal rate and regular rhythm.      Pulses: Normal pulses.      Heart sounds: Normal heart sounds. No murmur heard.  Pulmonary:      Effort: Pulmonary effort is normal.      Breath sounds: Normal breath sounds.   Chest:   Breasts:      Right: No supraclavicular adenopathy.      Left: No supraclavicular adenopathy.       Abdominal:      General: Bowel sounds are normal. There is no abdominal bruit.      Palpations: Abdomen is soft. There is no hepatomegaly or splenomegaly.      Tenderness: There is no abdominal tenderness.      Hernia: There is no hernia in the left inguinal area.   Genitourinary:     Vagina: Normal.      Adnexa:         Right: No mass.          Left: No mass.     Musculoskeletal:      Right lower leg: No edema.      Left lower leg: No edema.      Comments: Lipoma 6.75 x 6 cm   Lymphadenopathy:      Cervical: No cervical adenopathy.      Upper Body:      Right upper body: No supraclavicular adenopathy.      Left upper body: No supraclavicular adenopathy.   Skin:     General: Skin is warm.      Findings: No rash.   Neurological:      Mental Status: She is alert and oriented to person, place, and time.   Psychiatric:         Mood and Affect: Mood normal.         Speech: Speech normal.         Behavior: Behavior normal.         Assessment/Plan   Problem List Items Addressed This Visit        Circulatory    Essential hypertension - Primary     Hypertension well controlled    /80 - goal < 130/80  Recommend regular exercise and low salt " diet  Risk and potential complications of hypertension discussed  Importance of medication compliance and regular follow up emphasized  Role of medication/diet/exercise in treating hypertension discussed  Treatment plan and follow up reviewed and understood by patient         Relevant Orders    CBC and Differential       Digestive    Gastroesophageal reflux disease without esophagitis     Patient was counselled regarding triggers for symptoms - avoid caffeine/spicey foods/NO smoking  Elevate HOB 30 degrees - DO NOT lie flat for at least 30 minutes after eating  Take medications as directed            Endocrine/Metabolic    Mixed hyperlipidemia     FAIR hyperlipidemia control      - goal < 100  CONTINUE CURRENT THERAPY  Recommend low fat diet - Risks and potential complications of hyperlipidemia reviewed  Role of medications,diet, exercise in the treatment of hyperlipidemia discussed   Treatment plan and follow up reviewed and understood by patient         Relevant Orders    Comprehensive metabolic panel    Lipid panel    TSH 3rd Generation       Other    Anxiety and depression     Close follow for worsening SX  Same meds - long term safety of meds discussed         Family history of coronary artery disease in father          Jennifer RAMYJarad Moulton,   12/11/2021

## 2022-01-01 RX ORDER — CITALOPRAM 40 MG/1
TABLET, FILM COATED ORAL
Qty: 90 TABLET | Refills: 1 | Status: SHIPPED | OUTPATIENT
Start: 2022-01-01 | End: 2022-06-24

## 2022-01-01 RX ORDER — LISINOPRIL 10 MG/1
TABLET ORAL
Qty: 90 TABLET | Refills: 1 | Status: SHIPPED | OUTPATIENT
Start: 2022-01-01 | End: 2022-06-24

## 2022-03-31 RX ORDER — SIMVASTATIN 20 MG/1
TABLET, FILM COATED ORAL
Qty: 90 TABLET | Refills: 0 | Status: SHIPPED | OUTPATIENT
Start: 2022-03-31 | End: 2022-06-25

## 2022-03-31 NOTE — TELEPHONE ENCOUNTER
Medicine Refill Request    Last Office: 12/6/2021   Last Consult Visit: Visit date not found  Last Telemedicine Visit: Visit date not found    Next Appointment: Visit date not found      Current Outpatient Medications:   •  citalopram 40 mg tablet, TAKE 1 TABLET BY MOUTH EVERY DAY, Disp: 90 tablet, Rfl: 1  •  FA/mv,Ca,iron,min/lycopene/lut (MULTIVITAL ORAL), No SIG Entered, Disp: , Rfl:   •  fexofenadine-pseudoephedrine (ALLEGRA-D 24 HOUR) 180-240 mg per 24 hr tablet, take 1 tablet by oral route  every day on an empty stomach with a glass of water, Disp: 90 tablet, Rfl: 1  •  fluticasone (FLONASE ALLERGY RELIEF) 50 mcg/actuation nasal spray, inhale 1 spray (50MCG)  by intranasal route  every day in each nostril, Disp: , Rfl:   •  lisinopriL 10 mg tablet, TAKE 1 TABLET BY MOUTH EVERY DAY, Disp: 90 tablet, Rfl: 1  •  omega-3 fatty acids/fish oil (FISH OIL OMEGA 3-6-9 ORAL), No SIG Entered, Disp: , Rfl:   •  pantoprazole (PROTONIX) 40 mg EC tablet, Take 40 mg by mouth., Disp: , Rfl:   •  simvastatin (ZOCOR) 20 mg tablet, TAKE 1 TABLET BY MOUTH NIGHTLY AS DIRECTED, Disp: 90 tablet, Rfl: 1      BP Readings from Last 3 Encounters:   12/06/21 132/80   09/13/21 126/76   06/21/21 128/80       Recent Lab results:  Lab Results   Component Value Date    CHOL 210 (H) 06/17/2021   ,   Lab Results   Component Value Date    HDL 51 06/17/2021   ,   Lab Results   Component Value Date    LDLCALC 120 (H) 06/17/2021   ,   Lab Results   Component Value Date    TRIG 275 (H) 06/17/2021        Lab Results   Component Value Date    GLUCOSE 85 09/15/2020   , No results found for: HGBA1C      Lab Results   Component Value Date    CREATININE 0.91 09/15/2020       Lab Results   Component Value Date    TSH 1.45 09/15/2020

## 2022-04-26 ENCOUNTER — OFFICE VISIT (OUTPATIENT)
Dept: FAMILY MEDICINE | Facility: CLINIC | Age: 62
End: 2022-04-26
Payer: COMMERCIAL

## 2022-04-26 VITALS
DIASTOLIC BLOOD PRESSURE: 84 MMHG | WEIGHT: 149 LBS | OXYGEN SATURATION: 98 % | RESPIRATION RATE: 16 BRPM | BODY MASS INDEX: 27.42 KG/M2 | HEIGHT: 62 IN | TEMPERATURE: 98.2 F | SYSTOLIC BLOOD PRESSURE: 142 MMHG | HEART RATE: 117 BPM

## 2022-04-26 DIAGNOSIS — Z20.822 SUSPECTED COVID-19 VIRUS INFECTION: Primary | ICD-10-CM

## 2022-04-26 DIAGNOSIS — J01.10 SUBACUTE FRONTAL SINUSITIS: ICD-10-CM

## 2022-04-26 DIAGNOSIS — R10.9 FLANK PAIN: ICD-10-CM

## 2022-04-26 LAB
BILIRUBIN, POC: NEGATIVE
BLOOD URINE, POC: POSITIVE
CLARITY, POC: CLEAR
COLOR, POC: YELLOW
EXPIRATION DATE: NORMAL
GLUCOSE URINE, POC: NEGATIVE
KETONES, POC: NEGATIVE
LEUKOCYTE EST, POC: NEGATIVE
Lab: NORMAL
NITRITE, POC: NEGATIVE
PH, POC: 5
POCT MANUFACTURER: NORMAL
PROTEIN, POC: NORMAL
SPECIFIC GRAVITY, POC: 1.01
UROBILINOGEN, POC: 0.2

## 2022-04-26 PROCEDURE — 3079F DIAST BP 80-89 MM HG: CPT | Performed by: FAMILY MEDICINE

## 2022-04-26 PROCEDURE — 81002 URINALYSIS NONAUTO W/O SCOPE: CPT | Performed by: FAMILY MEDICINE

## 2022-04-26 PROCEDURE — 3008F BODY MASS INDEX DOCD: CPT | Performed by: FAMILY MEDICINE

## 2022-04-26 PROCEDURE — 87637 SARSCOV2&INF A&B&RSV AMP PRB: CPT | Performed by: FAMILY MEDICINE

## 2022-04-26 PROCEDURE — 99213 OFFICE O/P EST LOW 20 MIN: CPT | Mod: 25,CS | Performed by: FAMILY MEDICINE

## 2022-04-26 PROCEDURE — 3077F SYST BP >= 140 MM HG: CPT | Performed by: FAMILY MEDICINE

## 2022-04-26 RX ORDER — CLINDAMYCIN HYDROCHLORIDE 300 MG/1
300 CAPSULE ORAL 3 TIMES DAILY
Qty: 21 CAPSULE | Refills: 0 | Status: SHIPPED | OUTPATIENT
Start: 2022-04-26 | End: 2022-05-03

## 2022-04-26 ASSESSMENT — ENCOUNTER SYMPTOMS
SHORTNESS OF BREATH: 0
SINUS PRESSURE: 0
FEVER: 0
SORE THROAT: 0
HEADACHES: 0
CHILLS: 0
TROUBLE SWALLOWING: 0
SINUS PAIN: 1
FATIGUE: 1
COUGH: 0
ADENOPATHY: 0

## 2022-04-26 NOTE — PROGRESS NOTES
Subjective      Patient ID: Kolby Alonso is a 62 y.o. female.    HPI    Lower abdominal pressure x few days    No freq / burning  Right ear feeling full   Nasal congestion - clear to yellow    Grandson not in day care due to  POS covid in one of classmates - ? No SX to date      BM normal   Last PM GERD sx - starting pepcid per GI      The following have been reviewed and updated as appropriate in this visit:   Allergies  Meds  Problems         Review of Systems   Constitutional: Positive for fatigue. Negative for chills and fever.   HENT: Positive for ear pain and sinus pain (clear mucus). Negative for sinus pressure, sore throat and trouble swallowing.    Respiratory: Negative for cough and shortness of breath.    Cardiovascular: Negative for chest pain.   Skin: Positive for rash.   Neurological: Negative for headaches.   Hematological: Negative for adenopathy.       Current Outpatient Medications   Medication Sig Dispense Refill   • citalopram 40 mg tablet TAKE 1 TABLET BY MOUTH EVERY DAY 90 tablet 1   • clindamycin (Cleocin HCL) 300 mg capsule Take 1 capsule (300 mg total) by mouth 3 (three) times a day for 7 days. 21 capsule 0   • FA/mv,Ca,iron,min/lycopene/lut (MULTIVITAL ORAL) No SIG Entered     • fexofenadine-pseudoephedrine (ALLEGRA-D 24 HOUR) 180-240 mg per 24 hr tablet take 1 tablet by oral route  every day on an empty stomach with a glass of water 90 tablet 1   • fluticasone propionate (FLONASE) 50 mcg/actuation nasal spray inhale 1 spray (50MCG)  by intranasal route  every day in each nostril     • lisinopriL 10 mg tablet TAKE 1 TABLET BY MOUTH EVERY DAY 90 tablet 1   • omega-3 fatty acids/fish oil (FISH OIL OMEGA 3-6-9 ORAL) No SIG Entered     • pantoprazole (PROTONIX) 40 mg EC tablet Take 40 mg by mouth.     • simvastatin (ZOCOR) 20 mg tablet TAKE 1 TABLET BY MOUTH NIGHTLY AS DIRECTED 90 tablet 0     No current facility-administered medications for this visit.     History reviewed. No pertinent  "past medical history.  Family History   Problem Relation Age of Onset   • Heart disease Biological Father    • Hyperlipidemia Biological Father      Past Surgical History:   Procedure Laterality Date   • CHOLECYSTECTOMY     • OOPHORECTOMY     • TUBAL LIGATION       Allergies   Allergen Reactions   • Amoxicillin    • Azithromycin    • Erythromycin Base    • Levofloxacin    • Penicillins    • Sulfanilamide    • Tetracycline      Social History     Socioeconomic History   • Marital status:      Spouse name: Not on file   • Number of children: Not on file   • Years of education: Not on file   • Highest education level: Not on file   Occupational History   • Not on file   Tobacco Use   • Smoking status: Never Smoker   • Smokeless tobacco: Never Used   Substance and Sexual Activity   • Alcohol use: Not on file   • Drug use: Not on file   • Sexual activity: Not on file   Other Topics Concern   • Not on file   Social History Narrative   • Not on file     Social Determinants of Health     Financial Resource Strain: Not on file   Food Insecurity: Not on file   Transportation Needs: Not on file   Physical Activity: Not on file   Stress: Not on file   Social Connections: Not on file   Intimate Partner Violence: Not on file   Housing Stability: Not on file     Vitals:    04/26/22 0929   BP: (!) 142/84   BP Location: Left upper arm   Patient Position: Sitting   Pulse: (!) 117   Resp: 16   Temp: 36.8 °C (98.2 °F)   TempSrc: Oral   SpO2: 98%   Weight: 67.6 kg (149 lb)   Height: 1.575 m (5' 2\")       Objective     Physical Exam  Constitutional:       General: She is not in acute distress.  HENT:      Head: Normocephalic.      Right Ear: External ear normal. A middle ear effusion is present.      Left Ear: External ear normal.      Nose:      Right Sinus: Maxillary sinus tenderness present.      Mouth/Throat:      Mouth: Mucous membranes are moist.      Pharynx: Oropharynx is clear. No oropharyngeal exudate.   Cardiovascular: "      Rate and Rhythm: Normal rate and regular rhythm.   Pulmonary:      Effort: Pulmonary effort is normal.      Breath sounds: Normal breath sounds.   Abdominal:      Palpations: Abdomen is soft.      Tenderness: There is no abdominal tenderness.   Musculoskeletal:      Cervical back: Normal range of motion.   Lymphadenopathy:      Cervical: Cervical adenopathy present.      Right cervical: Superficial cervical adenopathy present.   Skin:     Findings: No rash.   Neurological:      Mental Status: She is alert.         Assessment/Plan   Problem List Items Addressed This Visit    None     Visit Diagnoses     Suspected COVID-19 virus infection    -  Primary    Relevant Orders    SARS-COV-2 (COVID-19)/ FLU A/B, AND RSV, PCR (Completed)    Subacute frontal sinusitis        ABX due to recurrence     Flank pain        counselled NO signs of UTI    Relevant Orders    POCT urinalysis dipstick (Completed)    Urinalysis with Reflex Culture (ED and Outpatient only) (Completed)          Jennifer Moulton DO  4/30/2022

## 2022-04-26 NOTE — PATIENT INSTRUCTIONS
Isolate until results    Take medications as directed until complete - potential side effects discussed  Contagiousness discussed  Fever control - Fluids  Follow up if not improved

## 2022-04-27 LAB
APPEARANCE UR: CLEAR
BACTERIA #/AREA URNS HPF: NORMAL /HPF
BACTERIA UR CULT: NORMAL
BILIRUB UR QL STRIP: NEGATIVE
COLOR UR: YELLOW
FLUAV RNA SPEC QL NAA+PROBE: NEGATIVE
FLUBV RNA SPEC QL NAA+PROBE: NEGATIVE
GLUCOSE UR QL STRIP: NEGATIVE
HGB UR QL STRIP: NEGATIVE
HYALINE CASTS #/AREA URNS LPF: NORMAL /LPF
KETONES UR QL STRIP: NEGATIVE
LEUKOCYTE ESTERASE UR QL STRIP: NEGATIVE
NITRITE UR QL STRIP: NEGATIVE
PH UR STRIP: 6 [PH] (ref 5–8)
PROT UR QL STRIP: NEGATIVE
RBC #/AREA URNS HPF: NORMAL /HPF
RSV RNA SPEC QL NAA+PROBE: NEGATIVE
SARS-COV-2 RNA RESP QL NAA+PROBE: NEGATIVE
SP GR UR STRIP: 1.01 (ref 1–1.03)
SQUAMOUS #/AREA URNS HPF: NORMAL /HPF
WBC #/AREA URNS HPF: NORMAL /HPF

## 2022-05-13 LAB
ALBUMIN SERPL-MCNC: 4.5 G/DL (ref 3.6–5.1)
ALBUMIN/GLOB SERPL: 2 (CALC) (ref 1–2.5)
ALP SERPL-CCNC: 74 U/L (ref 37–153)
ALT SERPL-CCNC: 17 U/L (ref 6–29)
AST SERPL-CCNC: 16 U/L (ref 10–35)
BASOPHILS # BLD AUTO: 92 CELLS/UL (ref 0–200)
BASOPHILS NFR BLD AUTO: 1.8 %
BILIRUB SERPL-MCNC: 0.6 MG/DL (ref 0.2–1.2)
BUN SERPL-MCNC: 14 MG/DL (ref 7–25)
BUN/CREAT SERPL: NORMAL (CALC) (ref 6–22)
CALCIUM SERPL-MCNC: 9.4 MG/DL (ref 8.6–10.4)
CHLORIDE SERPL-SCNC: 104 MMOL/L (ref 98–110)
CHOLEST SERPL-MCNC: 194 MG/DL
CHOLEST/HDLC SERPL: 3.6 (CALC)
CO2 SERPL-SCNC: 29 MMOL/L (ref 20–32)
CREAT SERPL-MCNC: 0.79 MG/DL (ref 0.5–0.99)
EOSINOPHIL # BLD AUTO: 189 CELLS/UL (ref 15–500)
EOSINOPHIL NFR BLD AUTO: 3.7 %
ERYTHROCYTE [DISTWIDTH] IN BLOOD BY AUTOMATED COUNT: 12.4 % (ref 11–15)
GLOBULIN SER CALC-MCNC: 2.2 G/DL (CALC) (ref 1.9–3.7)
GLUCOSE SERPL-MCNC: 86 MG/DL (ref 65–99)
HCT VFR BLD AUTO: 38.8 % (ref 35–45)
HDLC SERPL-MCNC: 54 MG/DL
HGB BLD-MCNC: 13 G/DL (ref 11.7–15.5)
LDLC SERPL CALC-MCNC: 108 MG/DL (CALC)
LYMPHOCYTES # BLD AUTO: 1362 CELLS/UL (ref 850–3900)
LYMPHOCYTES NFR BLD AUTO: 26.7 %
MCH RBC QN AUTO: 29.6 PG (ref 27–33)
MCHC RBC AUTO-ENTMCNC: 33.5 G/DL (ref 32–36)
MCV RBC AUTO: 88.4 FL (ref 80–100)
MONOCYTES # BLD AUTO: 485 CELLS/UL (ref 200–950)
MONOCYTES NFR BLD AUTO: 9.5 %
NEUTROPHILS # BLD AUTO: 2973 CELLS/UL (ref 1500–7800)
NEUTROPHILS NFR BLD AUTO: 58.3 %
NONHDLC SERPL-MCNC: 140 MG/DL (CALC)
PLATELET # BLD AUTO: 246 THOUSAND/UL (ref 140–400)
PMV BLD REES-ECKER: 10.7 FL (ref 7.5–12.5)
POTASSIUM SERPL-SCNC: 4.4 MMOL/L (ref 3.5–5.3)
PROT SERPL-MCNC: 6.7 G/DL (ref 6.1–8.1)
RBC # BLD AUTO: 4.39 MILLION/UL (ref 3.8–5.1)
SODIUM SERPL-SCNC: 139 MMOL/L (ref 135–146)
TRIGL SERPL-MCNC: 200 MG/DL
TSH SERPL-ACNC: 2.04 MIU/L (ref 0.4–4.5)
WBC # BLD AUTO: 5.1 THOUSAND/UL (ref 3.8–10.8)

## 2022-06-01 RX ORDER — FEXOFENADINE HCL AND PSEUDOEPHEDRINE HCL 180; 240 MG/1; MG/1
TABLET, EXTENDED RELEASE ORAL
Qty: 90 TABLET | Refills: 1 | Status: SHIPPED | OUTPATIENT
Start: 2022-06-01 | End: 2022-12-22 | Stop reason: SDUPTHER

## 2022-06-01 NOTE — TELEPHONE ENCOUNTER
Medicine Refill Request    Last Office: 4/26/2022   Last Consult Visit: Visit date not found  Last Telemedicine Visit: Visit date not found    Next Appointment: 6/20/2022      Current Outpatient Medications:   •  citalopram 40 mg tablet, TAKE 1 TABLET BY MOUTH EVERY DAY, Disp: 90 tablet, Rfl: 1  •  FA/mv,Ca,iron,min/lycopene/lut (MULTIVITAL ORAL), No SIG Entered, Disp: , Rfl:   •  fexofenadine-pseudoephedrine (ALLEGRA-D 24 HOUR) 180-240 mg per 24 hr tablet, take 1 tablet by oral route  every day on an empty stomach with a glass of water, Disp: 90 tablet, Rfl: 1  •  fluticasone propionate (FLONASE) 50 mcg/actuation nasal spray, inhale 1 spray (50MCG)  by intranasal route  every day in each nostril, Disp: , Rfl:   •  lisinopriL 10 mg tablet, TAKE 1 TABLET BY MOUTH EVERY DAY, Disp: 90 tablet, Rfl: 1  •  omega-3 fatty acids/fish oil (FISH OIL OMEGA 3-6-9 ORAL), No SIG Entered, Disp: , Rfl:   •  pantoprazole (PROTONIX) 40 mg EC tablet, Take 40 mg by mouth., Disp: , Rfl:   •  simvastatin (ZOCOR) 20 mg tablet, TAKE 1 TABLET BY MOUTH NIGHTLY AS DIRECTED, Disp: 90 tablet, Rfl: 0      BP Readings from Last 3 Encounters:   04/26/22 (!) 142/84   12/06/21 132/80   09/13/21 126/76       Recent Lab results:  Lab Results   Component Value Date    CHOL 194 05/13/2022   ,   Lab Results   Component Value Date    HDL 54 05/13/2022   ,   Lab Results   Component Value Date    LDLCALC 108 (H) 05/13/2022   ,   Lab Results   Component Value Date    TRIG 200 (H) 05/13/2022        Lab Results   Component Value Date    GLUCOSE 86 05/13/2022   , No results found for: HGBA1C      Lab Results   Component Value Date    CREATININE 0.79 05/13/2022       Lab Results   Component Value Date    TSH 2.04 05/13/2022

## 2022-06-09 ENCOUNTER — OFFICE VISIT (OUTPATIENT)
Dept: FAMILY MEDICINE | Facility: CLINIC | Age: 62
End: 2022-06-09
Payer: COMMERCIAL

## 2022-06-09 VITALS
TEMPERATURE: 98.8 F | BODY MASS INDEX: 26.87 KG/M2 | HEART RATE: 121 BPM | WEIGHT: 146 LBS | HEIGHT: 62 IN | SYSTOLIC BLOOD PRESSURE: 144 MMHG | DIASTOLIC BLOOD PRESSURE: 84 MMHG | RESPIRATION RATE: 18 BRPM | OXYGEN SATURATION: 98 %

## 2022-06-09 DIAGNOSIS — K21.9 GASTROESOPHAGEAL REFLUX DISEASE WITHOUT ESOPHAGITIS: ICD-10-CM

## 2022-06-09 DIAGNOSIS — I10 ESSENTIAL HYPERTENSION: ICD-10-CM

## 2022-06-09 DIAGNOSIS — R07.89 ATYPICAL CHEST PAIN: Primary | ICD-10-CM

## 2022-06-09 PROCEDURE — 93000 ELECTROCARDIOGRAM COMPLETE: CPT | Performed by: FAMILY MEDICINE

## 2022-06-09 PROCEDURE — 3077F SYST BP >= 140 MM HG: CPT | Performed by: FAMILY MEDICINE

## 2022-06-09 PROCEDURE — 3079F DIAST BP 80-89 MM HG: CPT | Performed by: FAMILY MEDICINE

## 2022-06-09 PROCEDURE — 3008F BODY MASS INDEX DOCD: CPT | Performed by: FAMILY MEDICINE

## 2022-06-09 PROCEDURE — 99214 OFFICE O/P EST MOD 30 MIN: CPT | Mod: 25 | Performed by: FAMILY MEDICINE

## 2022-06-09 RX ORDER — FAMOTIDINE 10 MG/1
10 TABLET ORAL 2 TIMES DAILY
COMMUNITY

## 2022-06-09 NOTE — PATIENT INSTRUCTIONS
INCREASE PANTOPRAZOLE TO TWICE DAILY WITH MEALS  PEPCID AT BEDTIME    Patient was counselled regarding triggers for symptoms - avoid caffeine/spicey foods/NO smoking  Elevate HOB 30 degrees - DO NOT lie flat for at least 30 minutes after eating  Take medications as directed

## 2022-06-09 NOTE — PROGRESS NOTES
Subjective      Patient ID: Kolby Alonso is a 62 y.o. female.    HPI      Recent diet and alcohol changes    Both to excess - severe related to cheese steak     Worried might be heart related      No exertional sx      No diapjhpresis    Home bp 123/70  Hr 81    The following have been reviewed and updated as appropriate in this visit:    Review of Systems   Constitutional: Negative for chills and fever.   Eyes: Negative for visual disturbance.   Respiratory: Negative for shortness of breath.    Cardiovascular: Negative for chest pain.   Gastrointestinal: Positive for abdominal pain (GERD worsening - had been OFF meds).   Skin: Negative for rash.       Current Outpatient Medications   Medication Sig Dispense Refill   • citalopram 40 mg tablet TAKE 1 TABLET BY MOUTH EVERY DAY 90 tablet 1   • FA/mv,Ca,iron,min/lycopene/lut (MULTIVITAL ORAL) No SIG Entered     • famotidine (PEPCID) 10 mg tablet Take 10 mg by mouth 2 (two) times a day.     • fexofenadine-pseudoephedrine (ALLEGRA-D 24 HOUR) 180-240 mg per 24 hr tablet take 1 tablet by oral route  every day on an empty stomach with a glass of water 90 tablet 1   • fluticasone propionate (FLONASE) 50 mcg/actuation nasal spray inhale 1 spray (50MCG)  by intranasal route  every day in each nostril     • lisinopriL 10 mg tablet TAKE 1 TABLET BY MOUTH EVERY DAY 90 tablet 1   • omega-3 fatty acids/fish oil (FISH OIL OMEGA 3-6-9 ORAL) No SIG Entered     • pantoprazole (PROTONIX) 40 mg EC tablet Take 40 mg by mouth.     • simvastatin (ZOCOR) 20 mg tablet TAKE 1 TABLET BY MOUTH NIGHTLY AS DIRECTED 90 tablet 0     No current facility-administered medications for this visit.     History reviewed. No pertinent past medical history.  Family History   Problem Relation Age of Onset   • Heart disease Biological Father    • Hyperlipidemia Biological Father      Past Surgical History:   Procedure Laterality Date   • CHOLECYSTECTOMY     • OOPHORECTOMY     • TUBAL LIGATION       Allergies  "  Allergen Reactions   • Amoxicillin    • Azithromycin    • Erythromycin Base    • Levofloxacin    • Penicillins    • Sulfanilamide    • Tetracycline      Social History     Socioeconomic History   • Marital status:      Spouse name: Not on file   • Number of children: Not on file   • Years of education: Not on file   • Highest education level: Not on file   Occupational History   • Not on file   Tobacco Use   • Smoking status: Never Smoker   • Smokeless tobacco: Never Used   Substance and Sexual Activity   • Alcohol use: Not on file   • Drug use: Not on file   • Sexual activity: Not on file   Other Topics Concern   • Not on file   Social History Narrative   • Not on file     Social Determinants of Health     Financial Resource Strain: Not on file   Food Insecurity: Not on file   Transportation Needs: Not on file   Physical Activity: Not on file   Stress: Not on file   Social Connections: Not on file   Intimate Partner Violence: Not on file   Housing Stability: Not on file     Vitals:    06/09/22 0921 06/09/22 0933   BP: (!) 142/80 (!) 144/84   BP Location: Left upper arm    Patient Position: Sitting    Pulse: (!) 121    Resp: 18    Temp: 37.1 °C (98.8 °F)    TempSrc: Oral    SpO2: 98%    Weight: 66.2 kg (146 lb)    Height: 1.575 m (5' 2\")        Objective     Physical Exam  Constitutional:       General: She is not in acute distress.  Neck:      Thyroid: No thyromegaly.      Vascular: No carotid bruit.   Cardiovascular:      Rate and Rhythm: Regular rhythm. Bradycardia present.      Pulses: No decreased pulses.      Heart sounds: No murmur heard.  Pulmonary:      Effort: Pulmonary effort is normal.      Breath sounds: Normal breath sounds.   Abdominal:      General: Bowel sounds are normal. There are signs of injury.      Palpations: Abdomen is soft. There is no hepatomegaly or splenomegaly.      Tenderness: There is abdominal tenderness in the epigastric area. There is no right CVA tenderness or left CVA " tenderness.   Musculoskeletal:      Right lower leg: No edema.      Left lower leg: No edema.   Lymphadenopathy:      Cervical: No cervical adenopathy.   Neurological:      Mental Status: She is alert.   Psychiatric:         Mood and Affect: Mood is anxious.         Speech: Speech normal.         Behavior: Behavior normal.         Assessment/Plan   Problem List Items Addressed This Visit        Circulatory    Essential hypertension     Hypertension well controlled    /84 - goal < 130/80  Recommend regular exercise and low salt diet  Risk and potential complications of hypertension discussed  Importance of medication compliance and regular follow up emphasized  Role of medication/diet/exercise in treating hypertension discussed  Treatment plan and follow up reviewed and understood by patient              Digestive    Gastroesophageal reflux disease without esophagitis     Patient was counselled regarding triggers for symptoms - avoid caffeine/spicey foods/NO smoking  Elevate HOB 30 degrees - DO NOT lie flat for at least 30 minutes after eating  Take medications as directed           Relevant Medications    famotidine (PEPCID) 10 mg tablet      Other Visit Diagnoses     Atypical chest pain    -  Primary    reassured EKG/ symptoms neg for cardiac etiology     Relevant Orders    ECG 12 LEAD OFFICE PERFORMED (Completed)          Jennifer Moulton DO  6/11/2022

## 2022-06-11 ASSESSMENT — ENCOUNTER SYMPTOMS
SHORTNESS OF BREATH: 0
CHILLS: 0
FEVER: 0
ABDOMINAL PAIN: 1

## 2022-06-11 NOTE — ASSESSMENT & PLAN NOTE
Hypertension well controlled    /84 - goal < 130/80  Recommend regular exercise and low salt diet  Risk and potential complications of hypertension discussed  Importance of medication compliance and regular follow up emphasized  Role of medication/diet/exercise in treating hypertension discussed  Treatment plan and follow up reviewed and understood by patient   RADS 3

## 2022-06-13 ENCOUNTER — TELEPHONE (OUTPATIENT)
Dept: FAMILY MEDICINE | Facility: CLINIC | Age: 62
End: 2022-06-13
Payer: COMMERCIAL

## 2022-06-13 RX ORDER — PANTOPRAZOLE SODIUM 40 MG/1
40 TABLET, DELAYED RELEASE ORAL 2 TIMES DAILY
Qty: 180 TABLET | Refills: 1 | Status: SHIPPED | OUTPATIENT
Start: 2022-06-13 | End: 2023-06-05

## 2022-06-20 ENCOUNTER — OFFICE VISIT (OUTPATIENT)
Dept: FAMILY MEDICINE | Facility: CLINIC | Age: 62
End: 2022-06-20
Payer: COMMERCIAL

## 2022-06-20 VITALS
SYSTOLIC BLOOD PRESSURE: 124 MMHG | RESPIRATION RATE: 16 BRPM | OXYGEN SATURATION: 99 % | HEIGHT: 62 IN | HEART RATE: 93 BPM | DIASTOLIC BLOOD PRESSURE: 82 MMHG | BODY MASS INDEX: 27.23 KG/M2 | WEIGHT: 148 LBS | TEMPERATURE: 98.3 F

## 2022-06-20 DIAGNOSIS — R07.89 ATYPICAL CHEST PAIN: ICD-10-CM

## 2022-06-20 DIAGNOSIS — F41.9 ANXIETY AND DEPRESSION: ICD-10-CM

## 2022-06-20 DIAGNOSIS — I10 ESSENTIAL HYPERTENSION: Primary | ICD-10-CM

## 2022-06-20 DIAGNOSIS — E78.2 MIXED HYPERLIPIDEMIA: ICD-10-CM

## 2022-06-20 DIAGNOSIS — K21.9 GASTROESOPHAGEAL REFLUX DISEASE WITHOUT ESOPHAGITIS: ICD-10-CM

## 2022-06-20 DIAGNOSIS — F32.A ANXIETY AND DEPRESSION: ICD-10-CM

## 2022-06-20 DIAGNOSIS — Z82.49 FAMILY HISTORY OF CORONARY ARTERY DISEASE IN FATHER: ICD-10-CM

## 2022-06-20 PROCEDURE — 3074F SYST BP LT 130 MM HG: CPT | Performed by: FAMILY MEDICINE

## 2022-06-20 PROCEDURE — 3008F BODY MASS INDEX DOCD: CPT | Performed by: FAMILY MEDICINE

## 2022-06-20 PROCEDURE — 99214 OFFICE O/P EST MOD 30 MIN: CPT | Performed by: FAMILY MEDICINE

## 2022-06-20 PROCEDURE — 3079F DIAST BP 80-89 MM HG: CPT | Performed by: FAMILY MEDICINE

## 2022-06-20 ASSESSMENT — ENCOUNTER SYMPTOMS
BRUISES/BLEEDS EASILY: 0
NAUSEA: 0
HEADACHES: 0
SHORTNESS OF BREATH: 0
DIZZINESS: 0
NUMBNESS: 0
CONSTIPATION: 0
DYSPHORIC MOOD: 1
DIARRHEA: 0
SLEEP DISTURBANCE: 0
BLOOD IN STOOL: 0
ADENOPATHY: 0
COLOR CHANGE: 0
PALPITATIONS: 0
FATIGUE: 0
HEMATURIA: 0
CHILLS: 0
COUGH: 0
FREQUENCY: 0
ABDOMINAL PAIN: 1
UNEXPECTED WEIGHT CHANGE: 0
ARTHRALGIAS: 0
CHEST TIGHTNESS: 1
DYSURIA: 0

## 2022-06-20 NOTE — PROGRESS NOTES
"Subjective      Patient ID: Kolby Alonso is a 62 y.o. female.    HPI      Medical management of hypertension, hyperlipidemia  Pre visit care team analia performed  FOLLOWING DIET -    CONCERNS - GERD SX SLOWLY BETTER                          \"CONSTANT ACHE\" ACW  The following have been reviewed and updated as appropriate in this visit:          Review of Systems   Constitutional: Negative for chills, fatigue and unexpected weight change.   Eyes: Negative for visual disturbance.   Respiratory: Positive for chest tightness. Negative for cough and shortness of breath.    Cardiovascular: Negative for chest pain, palpitations and leg swelling.   Gastrointestinal: Positive for abdominal pain. Negative for blood in stool, constipation, diarrhea and nausea.   Endocrine: Negative for cold intolerance and heat intolerance.   Genitourinary: Negative for dysuria, frequency, hematuria and urgency.   Musculoskeletal: Negative for arthralgias.   Skin: Negative for color change.   Neurological: Negative for dizziness, numbness and headaches.   Hematological: Negative for adenopathy. Does not bruise/bleed easily.   Psychiatric/Behavioral: Positive for dysphoric mood. Negative for sleep disturbance.       Current Outpatient Medications   Medication Sig Dispense Refill   • FA/mv,Ca,iron,min/lycopene/lut (MULTIVITAL ORAL) No SIG Entered     • famotidine (PEPCID) 10 mg tablet Take 10 mg by mouth 2 (two) times a day.     • fexofenadine-pseudoephedrine (ALLEGRA-D 24 HOUR) 180-240 mg per 24 hr tablet take 1 tablet by oral route  every day on an empty stomach with a glass of water 90 tablet 1   • fluticasone propionate (FLONASE) 50 mcg/actuation nasal spray inhale 1 spray (50MCG)  by intranasal route  every day in each nostril     • omega-3 fatty acids/fish oil (FISH OIL OMEGA 3-6-9 ORAL) No SIG Entered     • pantoprazole (PROTONIX) 40 mg EC tablet Take 1 tablet (40 mg total) by mouth 2 (two) times a day. 180 tablet 1   • citalopram " "(celeXA) 40 mg tablet TAKE 1 TABLET BY MOUTH EVERY DAY 90 tablet 1   • lisinopriL (PRINIVIL) 10 mg tablet TAKE 1 TABLET BY MOUTH EVERY DAY 90 tablet 1   • simvastatin (ZOCOR) 20 mg tablet TAKE 1 TABLET BY MOUTH NIGHTLY AS DIRECTED 90 tablet 1     No current facility-administered medications for this visit.     History reviewed. No pertinent past medical history.  Family History   Problem Relation Age of Onset   • Heart disease Biological Father    • Hyperlipidemia Biological Father      Past Surgical History:   Procedure Laterality Date   • CHOLECYSTECTOMY     • OOPHORECTOMY     • TUBAL LIGATION       Allergies   Allergen Reactions   • Amoxicillin    • Azithromycin    • Erythromycin Base    • Levofloxacin    • Penicillins    • Sulfanilamide    • Tetracycline      Social History     Socioeconomic History   • Marital status:      Spouse name: Not on file   • Number of children: Not on file   • Years of education: Not on file   • Highest education level: Not on file   Occupational History   • Not on file   Tobacco Use   • Smoking status: Never Smoker   • Smokeless tobacco: Never Used   Substance and Sexual Activity   • Alcohol use: Not on file   • Drug use: Not on file   • Sexual activity: Not on file   Other Topics Concern   • Not on file   Social History Narrative   • Not on file     Social Determinants of Health     Financial Resource Strain: Not on file   Food Insecurity: Not on file   Transportation Needs: Not on file   Physical Activity: Not on file   Stress: Not on file   Social Connections: Not on file   Intimate Partner Violence: Not on file   Housing Stability: Not on file     Vitals:    06/20/22 1733 06/20/22 1802   BP: (!) 144/84 124/82   BP Location: Left upper arm    Patient Position: Sitting    Pulse: 93    Resp: 16    Temp: 36.8 °C (98.3 °F)    TempSrc: Oral    SpO2: 99%    Weight: 67.1 kg (148 lb)    Height: 1.575 m (5' 2\")        Objective     Physical Exam  Constitutional:       Appearance: " Normal appearance. She is well-developed.   Eyes:      Pupils: Pupils are equal, round, and reactive to light.      Funduscopic exam:     Right eye: No AV nicking.         Left eye: No AV nicking.   Neck:      Thyroid: No thyromegaly.      Vascular: No carotid bruit.   Cardiovascular:      Rate and Rhythm: Normal rate and regular rhythm.      Pulses: Normal pulses.           Dorsalis pedis pulses are 2+ on the right side and 2+ on the left side.        Posterior tibial pulses are 2+ on the right side and 2+ on the left side.      Heart sounds: No murmur heard.  Pulmonary:      Effort: Pulmonary effort is normal.      Breath sounds: Normal breath sounds.   Abdominal:      General: Bowel sounds are normal.      Palpations: Abdomen is soft. There is no hepatomegaly or splenomegaly.      Tenderness: There is no abdominal tenderness. There is no right CVA tenderness or left CVA tenderness.   Musculoskeletal:      Right lower leg: No edema.      Left lower leg: No edema.   Lymphadenopathy:      Cervical: No cervical adenopathy.   Skin:     General: Skin is warm and dry.      Findings: No rash.   Neurological:      Mental Status: She is alert and oriented to person, place, and time.      Sensory: No sensory deficit.   Psychiatric:         Mood and Affect: Mood normal.         Speech: Speech normal.         Behavior: Behavior normal.         Assessment/Plan   Problem List Items Addressed This Visit        Circulatory    Essential hypertension - Primary     Hypertension well controlled    /82 - goal < 130/80  Recommend regular exercise and low salt diet  Risk and potential complications of hypertension discussed  Importance of medication compliance and regular follow up emphasized  Role of medication/diet/exercise in treating hypertension discussed  Treatment plan and follow up reviewed and understood by patient              Digestive    Gastroesophageal reflux disease without esophagitis     Patient was counselled  regarding triggers for symptoms - avoid caffeine/spicey foods/NO smoking  Elevate HOB 30 degrees - DO NOT lie flat for at least 30 minutes after eating  Take medications as directed              Endocrine/Metabolic    Mixed hyperlipidemia     GOOD hyperlipidemia control      - goal < 100  CONTINUE CURRENT THERAPY  Recommend low fat diet - Risks and potential complications of hyperlipidemia reviewed  Role of medications,diet, exercise in the treatment of hyperlipidemia discussed   Treatment plan and follow up reviewed and understood by patient              Other    Anxiety and depression     Same meds - long term safety of meds discussed           Family history of coronary artery disease in father    Relevant Orders    CT HEART CORONARY ARTERY CALCIUM SCORE WITHOUT IV CONTRAST (Completed)      Other Visit Diagnoses     Atypical chest pain        screening for occult CAD    Relevant Orders    CT HEART CORONARY ARTERY CALCIUM SCORE WITHOUT IV CONTRAST (Completed)          Jennifer Moulton,   6/25/2022

## 2022-06-22 ENCOUNTER — HOSPITAL ENCOUNTER (OUTPATIENT)
Dept: RADIOLOGY | Age: 62
Discharge: HOME | End: 2022-06-22
Attending: FAMILY MEDICINE

## 2022-06-22 DIAGNOSIS — R07.89 ATYPICAL CHEST PAIN: ICD-10-CM

## 2022-06-22 DIAGNOSIS — Z82.49 FAMILY HISTORY OF CORONARY ARTERY DISEASE IN FATHER: ICD-10-CM

## 2022-06-22 PROCEDURE — G1004 CDSM NDSC: HCPCS

## 2022-06-24 RX ORDER — LISINOPRIL 10 MG/1
TABLET ORAL
Qty: 90 TABLET | Refills: 1 | Status: SHIPPED | OUTPATIENT
Start: 2022-06-24 | End: 2022-12-22

## 2022-06-24 RX ORDER — CITALOPRAM 40 MG/1
TABLET, FILM COATED ORAL
Qty: 90 TABLET | Refills: 1 | Status: SHIPPED | OUTPATIENT
Start: 2022-06-24 | End: 2022-12-19

## 2022-06-25 NOTE — ASSESSMENT & PLAN NOTE
Hypertension well controlled    /82 - goal < 130/80  Recommend regular exercise and low salt diet  Risk and potential complications of hypertension discussed  Importance of medication compliance and regular follow up emphasized  Role of medication/diet/exercise in treating hypertension discussed  Treatment plan and follow up reviewed and understood by patient

## 2022-06-25 NOTE — ASSESSMENT & PLAN NOTE
GOOD hyperlipidemia control      - goal < 100  CONTINUE CURRENT THERAPY  Recommend low fat diet - Risks and potential complications of hyperlipidemia reviewed  Role of medications,diet, exercise in the treatment of hyperlipidemia discussed   Treatment plan and follow up reviewed and understood by patient

## 2022-07-20 ENCOUNTER — TELEPHONE (OUTPATIENT)
Dept: FAMILY MEDICINE | Facility: CLINIC | Age: 62
End: 2022-07-20
Payer: COMMERCIAL

## 2022-07-20 RX ORDER — ALPRAZOLAM 0.5 MG/1
TABLET ORAL
Qty: 2 TABLET | Refills: 0 | Status: SHIPPED | OUTPATIENT
Start: 2022-07-20 | End: 2022-12-22

## 2022-07-20 NOTE — TELEPHONE ENCOUNTER
Pt has [facility]uc [facility] Imaging scheduled for 7/27  3 hour scan  Was told to call PCP if needed any pre-med for anxiety  Pt asking for 1x dose of anxiety pre-med

## 2022-08-09 ENCOUNTER — TELEPHONE (OUTPATIENT)
Dept: FAMILY MEDICINE | Facility: CLINIC | Age: 62
End: 2022-08-09
Payer: COMMERCIAL

## 2022-08-09 RX ORDER — BENZONATATE 100 MG/1
100 CAPSULE ORAL 3 TIMES DAILY PRN
Qty: 30 CAPSULE | Refills: 0 | Status: SHIPPED | OUTPATIENT
Start: 2022-08-09 | End: 2022-08-19

## 2022-08-09 RX ORDER — CLINDAMYCIN HYDROCHLORIDE 300 MG/1
300 CAPSULE ORAL 3 TIMES DAILY
Qty: 21 CAPSULE | Refills: 0 | Status: SHIPPED | OUTPATIENT
Start: 2022-08-09 | End: 2022-08-16

## 2022-08-09 NOTE — TELEPHONE ENCOUNTER
"Spoke with  about his results and  asked if patient can also have order for ABX and cough RX     stated, \"she has the exact same symptoms as me. Green mucus, cough, and runny nose.\"    ABX & cough Rx to [facility] in [facility]   "

## 2022-09-30 NOTE — ASSESSMENT & PLAN NOTE
Risk factor reduction discussed   GOAL: Pt will improve strength to at least a 4+/5 in order to improve safety with functional mobility in 2 weeks

## 2022-10-17 LAB
ALT SERPL-CCNC: 24 U/L (ref 6–29)
AST SERPL-CCNC: 19 U/L (ref 10–35)
CHOLEST SERPL-MCNC: 190 MG/DL
CHOLEST/HDLC SERPL: 4.1 (CALC)
CK SERPL-CCNC: 69 U/L (ref 29–143)
HDLC SERPL-MCNC: 46 MG/DL
LDLC SERPL CALC-MCNC: 106 MG/DL (CALC)
NONHDLC SERPL-MCNC: 144 MG/DL (CALC)
TRIGL SERPL-MCNC: 265 MG/DL

## 2022-12-19 RX ORDER — CITALOPRAM 40 MG/1
TABLET, FILM COATED ORAL
Qty: 90 TABLET | Refills: 1 | Status: SHIPPED | OUTPATIENT
Start: 2022-12-19 | End: 2023-07-17 | Stop reason: SDUPTHER

## 2022-12-22 ENCOUNTER — OFFICE VISIT (OUTPATIENT)
Dept: FAMILY MEDICINE | Facility: CLINIC | Age: 62
End: 2022-12-22
Payer: COMMERCIAL

## 2022-12-22 VITALS
BODY MASS INDEX: 27.82 KG/M2 | HEIGHT: 62 IN | DIASTOLIC BLOOD PRESSURE: 80 MMHG | WEIGHT: 151.2 LBS | SYSTOLIC BLOOD PRESSURE: 134 MMHG | RESPIRATION RATE: 14 BRPM | TEMPERATURE: 98.2 F | HEART RATE: 68 BPM | OXYGEN SATURATION: 98 %

## 2022-12-22 DIAGNOSIS — Z82.49 FAMILY HISTORY OF CORONARY ARTERY DISEASE IN FATHER: ICD-10-CM

## 2022-12-22 DIAGNOSIS — K21.9 GASTROESOPHAGEAL REFLUX DISEASE WITHOUT ESOPHAGITIS: ICD-10-CM

## 2022-12-22 DIAGNOSIS — F32.A ANXIETY AND DEPRESSION: ICD-10-CM

## 2022-12-22 DIAGNOSIS — I10 ESSENTIAL HYPERTENSION: ICD-10-CM

## 2022-12-22 DIAGNOSIS — Z00.00 WELL WOMAN EXAM WITHOUT GYNECOLOGICAL EXAM: Primary | ICD-10-CM

## 2022-12-22 DIAGNOSIS — F41.9 ANXIETY AND DEPRESSION: ICD-10-CM

## 2022-12-22 DIAGNOSIS — J30.1 CHRONIC SEASONAL ALLERGIC RHINITIS DUE TO POLLEN: ICD-10-CM

## 2022-12-22 DIAGNOSIS — E78.2 MIXED HYPERLIPIDEMIA: ICD-10-CM

## 2022-12-22 PROCEDURE — 99396 PREV VISIT EST AGE 40-64: CPT | Performed by: FAMILY MEDICINE

## 2022-12-22 PROCEDURE — 3075F SYST BP GE 130 - 139MM HG: CPT | Performed by: FAMILY MEDICINE

## 2022-12-22 PROCEDURE — 3008F BODY MASS INDEX DOCD: CPT | Performed by: FAMILY MEDICINE

## 2022-12-22 PROCEDURE — 3079F DIAST BP 80-89 MM HG: CPT | Performed by: FAMILY MEDICINE

## 2022-12-22 RX ORDER — FEXOFENADINE HCL AND PSEUDOEPHEDRINE HCL 180; 240 MG/1; MG/1
TABLET, EXTENDED RELEASE ORAL
Qty: 90 TABLET | Refills: 1 | Status: SHIPPED | OUTPATIENT
Start: 2022-12-22

## 2022-12-22 RX ORDER — EZETIMIBE 10 MG/1
10 TABLET ORAL EVERY EVENING
COMMUNITY
Start: 2022-11-25

## 2022-12-22 RX ORDER — LISINOPRIL 20 MG/1
20 TABLET ORAL DAILY
COMMUNITY
Start: 2022-12-01

## 2022-12-22 RX ORDER — ATORVASTATIN CALCIUM 10 MG/1
20 TABLET, FILM COATED ORAL EVERY EVENING
COMMUNITY
Start: 2022-10-24 | End: 2024-05-21

## 2022-12-22 ASSESSMENT — ENCOUNTER SYMPTOMS
BLOOD IN STOOL: 0
WEAKNESS: 0
CHILLS: 0
NAUSEA: 0
TREMORS: 0
VOICE CHANGE: 0
LIGHT-HEADEDNESS: 0
CHEST TIGHTNESS: 0
STRIDOR: 0
BRUISES/BLEEDS EASILY: 0
DIZZINESS: 0
PALPITATIONS: 0
FREQUENCY: 0
WHEEZING: 0
BACK PAIN: 0
SEIZURES: 0
HEADACHES: 0
SORE THROAT: 0
DYSURIA: 0
SLEEP DISTURBANCE: 0
COUGH: 0
FATIGUE: 0
DIARRHEA: 0
CONSTIPATION: 0
ARTHRALGIAS: 0
DYSPHORIC MOOD: 0
FEVER: 0
SHORTNESS OF BREATH: 0

## 2022-12-22 NOTE — PROGRESS NOTES
Subjective      Patient ID: Kolby Alonso is a 62 y.o. female.    HPI    Generally well  +/- following diet - No exercise    Recent change Iipid RX by cardiology        150/172/52/73         cpk 81    Check size lump arm    The following have been reviewed and updated as appropriate in this visit:   Allergies  Meds  Problems       Review of Systems   Constitutional: Negative for chills, fatigue, fever and unexpected weight change.   HENT: Negative for ear pain, hearing loss, sore throat, tinnitus and voice change.    Eyes: Negative for visual disturbance (exam current).   Respiratory: Negative for cough, chest tightness, shortness of breath, wheezing and stridor.    Cardiovascular: Negative for chest pain, palpitations and leg swelling.   Gastrointestinal: Positive for abdominal pain (GERD sx increases - plans restart PPI per GI instructions). Negative for blood in stool, constipation, diarrhea and nausea.   Endocrine: Negative for cold intolerance and heat intolerance.   Genitourinary: Negative for dysuria, frequency, pelvic pain, vaginal bleeding and vaginal discharge.   Musculoskeletal: Negative for arthralgias, back pain and gait problem.   Skin: Negative for color change.   Neurological: Negative for dizziness, tremors, seizures, syncope, weakness, light-headedness and headaches.   Hematological: Does not bruise/bleed easily.   Psychiatric/Behavioral: Negative for dysphoric mood and sleep disturbance.       Current Outpatient Medications   Medication Sig Dispense Refill   • atorvastatin (LIPITOR) 10 mg tablet Take 10 mg by mouth every evening.     • citalopram (celeXA) 40 mg tablet TAKE 1 TABLET BY MOUTH EVERY DAY 90 tablet 1   • ezetimibe (ZETIA) 10 mg tablet Take 10 mg by mouth every evening.     • FA/mv,Ca,iron,min/lycopene/lut (MULTIVITAL ORAL) No SIG Entered     • famotidine (PEPCID) 10 mg tablet Take 10 mg by mouth 2 (two) times a day.     • fexofenadine-pseudoephedrine (ALLEGRA-D 24 HOUR) 180-240 mg  per 24 hr tablet take 1 tablet by oral route  every day on an empty stomach with a glass of water 90 tablet 1   • fluticasone propionate (FLONASE) 50 mcg/actuation nasal spray inhale 1 spray (50MCG)  by intranasal route  every day in each nostril     • omega-3 fatty acids/fish oil (FISH OIL OMEGA 3-6-9 ORAL) No SIG Entered     • pantoprazole (PROTONIX) 40 mg EC tablet Take 1 tablet (40 mg total) by mouth 2 (two) times a day. 180 tablet 1   • lisinopriL (PRINIVIL) 20 mg tablet Take 20 mg by mouth daily.       No current facility-administered medications for this visit.     No past medical history on file.  Family History   Problem Relation Age of Onset   • Heart disease Biological Father    • Hyperlipidemia Biological Father      Past Surgical History:   Procedure Laterality Date   • CHOLECYSTECTOMY     • OOPHORECTOMY     • TUBAL LIGATION       Allergies   Allergen Reactions   • Amoxicillin    • Azithromycin    • Erythromycin Base    • Levofloxacin    • Penicillins    • Sulfanilamide    • Tetracycline      Social History     Socioeconomic History   • Marital status:      Spouse name: Not on file   • Number of children: Not on file   • Years of education: Not on file   • Highest education level: Not on file   Occupational History   • Not on file   Tobacco Use   • Smoking status: Never   • Smokeless tobacco: Never   Substance and Sexual Activity   • Alcohol use: Not on file   • Drug use: Not on file   • Sexual activity: Not on file   Other Topics Concern   • Not on file   Social History Narrative   • Not on file     Social Determinants of Health     Financial Resource Strain: Not on file   Food Insecurity: Not on file   Transportation Needs: Not on file   Physical Activity: Not on file   Stress: Not on file   Social Connections: Not on file   Intimate Partner Violence: Not on file   Housing Stability: Not on file     Vitals:    12/22/22 1434 12/22/22 1507   BP: 130/72 134/80   BP Location: Left upper arm   "  Patient Position: Sitting    Pulse: 68    Resp: 14    Temp: 36.8 °C (98.2 °F)    TempSrc: Oral    SpO2: 98%    Weight: 68.6 kg (151 lb 3.2 oz)    Height: 1.575 m (5' 2\")      Objective     Physical Exam  Constitutional:       Appearance: Normal appearance. She is well-developed and well-nourished.   HENT:      Head: Normocephalic.      Right Ear: Tympanic membrane normal.      Left Ear: Tympanic membrane normal.      Nose: Nose normal.      Mouth/Throat:      Mouth: Oropharynx is clear and moist. Mucous membranes are moist.   Eyes:      General: Lids are normal. Lids are everted, no foreign bodies appreciated.      Extraocular Movements: EOM normal.      Pupils: Pupils are equal, round, and reactive to light.      Funduscopic exam:     Right eye: No AV nicking.         Left eye: No AV nicking.   Neck:      Thyroid: No thyromegaly.      Vascular: No carotid bruit.   Cardiovascular:      Rate and Rhythm: Normal rate and regular rhythm.      Pulses: Normal pulses.      Heart sounds: Normal heart sounds. No murmur heard.  Pulmonary:      Effort: Pulmonary effort is normal.      Breath sounds: Normal breath sounds.   Abdominal:      General: Bowel sounds are normal. There is no abdominal bruit. Aorta is normal.      Palpations: Abdomen is soft. There is no hepatomegaly or splenomegaly.      Tenderness: There is no abdominal tenderness. There is no right CVA tenderness or left CVA tenderness.      Hernia: There is no hernia in the right inguinal area.   Musculoskeletal:      Right lower leg: No edema.      Left lower leg: No edema.   Lymphadenopathy:      Cervical: No cervical adenopathy.      Upper Body:   No axillary adenopathy present.     Right upper body: No supraclavicular adenopathy.      Left upper body: No supraclavicular adenopathy.   Skin:     General: Skin is warm.      Findings: No rash.      Comments: 5 1/4 x 4 cm   Neurological:      Mental Status: She is alert and oriented to person, place, and time. "   Psychiatric:         Mood and Affect: Mood and affect and mood normal.         Speech: Speech normal.         Behavior: Behavior normal.         Assessment/Plan   Problem List Items Addressed This Visit        Circulatory    Essential hypertension     Hypertension well controlled    /80 - goal < 130/80  Recommend regular exercise and low salt diet  Risk and potential complications of hypertension discussed  Importance of medication compliance and regular follow up emphasized  Role of medication/diet/exercise in treating hypertension discussed  Treatment plan and follow up reviewed and understood by patient         Relevant Medications    lisinopriL (PRINIVIL) 20 mg tablet       Digestive    Gastroesophageal reflux disease without esophagitis     Patient was counselled regarding triggers for symptoms - avoid caffeine/spicey foods/NO smoking  Elevate HOB 30 degrees - DO NOT lie flat for at least 30 minutes after eating  Take medications as directed            Ears/Nose/Throat    Chronic seasonal allergic rhinitis due to pollen     same meds - long term safety meds discussed            Mental Health    Anxiety and depression     Same meds - long term safety of meds discussed            Other    Mixed hyperlipidemia     GOOD hyperlipidemia control     LDL 73 - goal < 100  CONTINUE CURRENT THERAPY  Recommend low fat diet - Risks and potential complications of hyperlipidemia reviewed  Role of medications,diet, exercise in the treatment of hyperlipidemia discussed   Treatment plan and follow up reviewed and understood by patient         Relevant Medications    atorvastatin (LIPITOR) 10 mg tablet    ezetimibe (ZETIA) 10 mg tablet    Family history of coronary artery disease in father   Other Visit Diagnoses     Well woman exam without gynecological exam    -  Primary          Jennifer Moulton DO  12/26/2022

## 2022-12-22 NOTE — PATIENT INSTRUCTIONS
SAME MEDS   Routine advice given on diet/exercise/weight  Recommend monthly self breast exams  Ne  Discuss - colon current  Recommend calcium with D3  Immunization update discussed

## 2022-12-26 ASSESSMENT — ENCOUNTER SYMPTOMS
COLOR CHANGE: 0
UNEXPECTED WEIGHT CHANGE: 0
ABDOMINAL PAIN: 1

## 2022-12-26 NOTE — ASSESSMENT & PLAN NOTE
GOOD hyperlipidemia control     LDL 73 - goal < 100  CONTINUE CURRENT THERAPY  Recommend low fat diet - Risks and potential complications of hyperlipidemia reviewed  Role of medications,diet, exercise in the treatment of hyperlipidemia discussed   Treatment plan and follow up reviewed and understood by patient

## 2023-03-07 LAB
ALT SERPL-CCNC: 17 U/L (ref 6–29)
AST SERPL-CCNC: 17 U/L (ref 10–35)
CHOLEST SERPL-MCNC: 167 MG/DL
CHOLEST/HDLC SERPL: 3.3 (CALC)
CK SERPL-CCNC: 74 U/L (ref 29–143)
HDLC SERPL-MCNC: 51 MG/DL
LDLC SERPL CALC-MCNC: 90 MG/DL (CALC)
NONHDLC SERPL-MCNC: 116 MG/DL (CALC)
TRIGL SERPL-MCNC: 165 MG/DL

## 2023-06-05 ENCOUNTER — OFFICE VISIT (OUTPATIENT)
Dept: FAMILY MEDICINE | Facility: CLINIC | Age: 63
End: 2023-06-05
Payer: MEDICARE

## 2023-06-05 VITALS
OXYGEN SATURATION: 97 % | TEMPERATURE: 98.9 F | RESPIRATION RATE: 16 BRPM | BODY MASS INDEX: 27.57 KG/M2 | HEART RATE: 86 BPM | WEIGHT: 149.8 LBS | SYSTOLIC BLOOD PRESSURE: 120 MMHG | DIASTOLIC BLOOD PRESSURE: 82 MMHG | HEIGHT: 62 IN

## 2023-06-05 DIAGNOSIS — E78.2 MIXED HYPERLIPIDEMIA: ICD-10-CM

## 2023-06-05 DIAGNOSIS — K21.9 GASTROESOPHAGEAL REFLUX DISEASE WITHOUT ESOPHAGITIS: ICD-10-CM

## 2023-06-05 DIAGNOSIS — I10 ESSENTIAL HYPERTENSION: ICD-10-CM

## 2023-06-05 DIAGNOSIS — Z11.4 ENCOUNTER FOR SCREENING FOR HIV: ICD-10-CM

## 2023-06-05 DIAGNOSIS — Z00.00 MEDICARE ANNUAL WELLNESS VISIT, INITIAL: Primary | ICD-10-CM

## 2023-06-05 DIAGNOSIS — F32.A ANXIETY AND DEPRESSION: ICD-10-CM

## 2023-06-05 DIAGNOSIS — F41.9 ANXIETY AND DEPRESSION: ICD-10-CM

## 2023-06-05 PROCEDURE — 90677 PCV20 VACCINE IM: CPT | Performed by: STUDENT IN AN ORGANIZED HEALTH CARE EDUCATION/TRAINING PROGRAM

## 2023-06-05 PROCEDURE — G0402 INITIAL PREVENTIVE EXAM: HCPCS | Performed by: STUDENT IN AN ORGANIZED HEALTH CARE EDUCATION/TRAINING PROGRAM

## 2023-06-05 PROCEDURE — G0403 EKG FOR INITIAL PREVENT EXAM: HCPCS | Performed by: STUDENT IN AN ORGANIZED HEALTH CARE EDUCATION/TRAINING PROGRAM

## 2023-06-05 PROCEDURE — G0009 ADMIN PNEUMOCOCCAL VACCINE: HCPCS | Performed by: STUDENT IN AN ORGANIZED HEALTH CARE EDUCATION/TRAINING PROGRAM

## 2023-06-05 ASSESSMENT — PATIENT HEALTH QUESTIONNAIRE - PHQ9: SUM OF ALL RESPONSES TO PHQ9 QUESTIONS 1 & 2: 0

## 2023-06-05 ASSESSMENT — MINI COG
TOTAL SCORE: 4
COMPLETED: YES

## 2023-06-05 NOTE — ASSESSMENT & PLAN NOTE
Last Pap 1/4/2021 neg cotesting  DEXA on 5/12/2023 showed T-scores consistent with osteoporosis  Mammo 1/7/23 negative  Colonoscopies every 5 years, last 10/25/19    EKG today reviewed and unremarkable, no ischemic changes.

## 2023-06-05 NOTE — PATIENT INSTRUCTIONS
I recommend Dr. Jodie Velez or Dr. Lindsay Prince through [facility]                         Your Personalized Prevention Plan Services (PPPS)    Preventive Services Checklist (Assumes Average Risk Unless Otherwise Noted):    Health Maintenance Topics with due status: Overdue       Topic Date Due    Depression Screening Never done    HIV Screening Never done    Zoster Vaccine 12/23/2020    DTaP, Tdap, and Td Vaccines 02/15/2022    Pneumococcal (65 years and older) Never done     Health Maintenance Topics with due status: Not Due       Topic Last Completion Date    Colorectal Cancer Screening 10/25/2019    Cervical Cancer Screening 08/10/2020    Breast Cancer Screening 01/13/2023    DEXA Scan 05/12/2023    Falls Risk Screening 05/29/2023    Medicare Annual Wellness Visit 06/05/2023     Health Maintenance Topics with due status: Completed       Topic Last Completion Date    Hepatitis C Screening 11/07/2017    Influenza Vaccine 10/10/2022    COVID-19 Vaccine 10/10/2022     Health Maintenance Topics with due status: Aged Out       Topic Date Due    Meningococcal ACWY Aged Out    HIB Vaccines Aged Out    Hepatitis B Vaccines Aged Out    IPV Vaccines Aged Out    HPV Vaccines Aged Out       You May Be Eligible for These Additional Preventive Services   (Assumes Average Risk Unless Otherwise Noted)  Diabetes Screening Any 1 risk factor: hypertension, dyslipidemia, obesity, high glucose; or Any 2 risk factors: >=64yo, overweight, family history diabetes (covered every 6 months)   Hepatitis C Screening Any 1 risk factor: 1) blood transfusion before 1992,   2) current or past injection drug use (annually for high risk; if born between 7230-5189, see above for status).   Vaccine: Hepatitis B As necessary if at-risk: hemophilia, ESRD, diabetes, living with individual infected with hep B, healthcare worker with frequent contact with blood/bodily fluids (series covered once)   Sexually Transmitted Diseases  (STDs) As necessary chlamydia, gonorrhea, syphilis, hepatitis B (covered annually)  HIV if any 1 risk factor present: 1) <16yo or >66yo and at increased risk or 2) 15-66yo and ask for it (covered annually)   Lung Cancer Screening Low dose chest CT if all three risk factors: 1) 50-76yo, 2) smoker or quit within last 15y, 3) >=20 pack years (covered annually).  No results found for this or any previous visit.       Cholesterol Screening Both risk factors: 1) >=19yo and 2)  increased risk coronary artery disease (covered every 5 years).     Breast Cancer Screening Covered once 35-40yo, annually >=39yo (if >=49yo, see above for status).         Health Risk Factors with Personalized Education:  ----------------------------------------------------------------------------------------------------------------------  Stress management:  Understanding Your Stress  Think about how you know when you’re stressed.  Think about how your thoughts or behaviors are different when you’re stressed.  Think about what triggers stress for you.  Think about how you handle stress, and whether you’re making unhealthy choices in response to stress (like smoking, drinking alcohol or overeating).  Managing Stress  Take a break from the stressful situation.  Reduce your stress levels by exercising, talking with family/friends, ensuring adequate sleep.  Consider meditation or yoga.  Make sure you plan time to do things you enjoy.  Let your PCP know if you’re having problems limiting your stress.  ----------------------------------------------------------------------------------------------------------------------  Controlling Your Blood Pressure  Maintain a normal weight (body mass index between 18.5 and 24.9).  Eat more fruit, vegetables and low-fat dairy.  Eat less saturated fat and total fat.  Lower your sodium (salt) intake.  Try to stay under 1500 mg per day, but if you cannot get your intake to be that low, at least lower it by 1000  mg.  Stay active.  Try to get at least 90 to 150 minutes of exercise per week.  Try brisk walking, swimming, bicycling or dancing.  Limit alcohol intake.  When you do consume alcohol, drink no more than 1 drink per day.  If you have been prescribed medication, take it regularly and exactly as prescribed.  Let your PCP know if you have any problems or questions about your medication.  Check your blood pressure at home or at the store.  Write down your readings and share them with your PCP  ----------------------------------------------------------------------------------------------------------------------  Controlling Your Cholesterol  Reduce the amount of saturated and trans fat in your diet.  Limit intake of red meat.  Consume only low-fat or non-fat/skim dairy.  Limit fried food.  Cook with vegetable oils.  Reduce your intake of sugary foods, sugary drinks and alcohol.  Eat a diet high in fruit, vegetables and whole grains.  Get protein from fish, poultry and a small portion of nuts.  Stay active.  Try to get at least 90 to 150 minutes of exercise per week.  Try brisk walking, swimming, bicycling or dancing.  Maintain a healthy weight by balancing your diet and exercise.  If you have been prescribed medication, take it regularly and exactly as prescribed. Let your PCP know if you have any problems or questions about your medication.  It’s important to know your cholesterol numbers.  When recommended by your PCP, get the cholesterol blood test.  ----------------------------------------------------------------------------------------------------------------------  Maintaining Strong Bones  Try to get at least 90 to 150 minutes of weight-bearing exercise per week.  Ensure intake of at least 1200mg of calcium per day.  Eat foods high in calcium like milk and other dairy, green vegetables, fruit, canned fish with soft and edible bones, nuts, calcium-set tofu.  Some foods are calcium-fortified, like bread, cereal,  fruit juices and mineral water.  Help your body make vitamin D by getting 10-15 minutes per day of sunlight.    Ensure intake of at least 600IU of vitamin D per day.  Eat foods high in vitamin D like oily fish (salmon, sardines, mackerel) and eggs.  Some foods are fortified with vitamin D, like dairy and cereals.  Avoid high amounts of caffeine and salt, since they can cause the body to loose calcium.  Limit alcohol intake, since it is associated with weaker bones and is associated with falls and fractures.  Limit intake of fizzy drinks.  ----------------------------------------------------------------------------------------------------------------------  Reducing Your Risk of Falls  Tell your PCP if any of your medications make you feel tired, dizzy, lightheaded or off-balance.  Maintain coordination, flexibility and balance by ensuring regular physical activity.  Limit alcohol intake to 1 drink per day.  Consider avoiding all alcohol intake.  Ensure good vision.  Visit an ophthalmologist or optometrist regularly for vision screening or to make sure your glasses / contact lens prescription is correct.  If you need glasses or contacts, wear them.  When you get new glasses or contacts, take time to get used to them.  Do not wear sunglasses or tinted lenses when indoors.  Ensure good hearing.  Have your hearing checked if you are having trouble hearing, or family and friends think you cannot hear them.  If you need a hearing aid, be sure it fits well and wear it.  Get enough rest.  Ensure about 7-9 hours of sleep every day.  Get up slowly from your bed or chairs.  Do not start walking until you are sure you feel steady.  Wear non-skid, rubber-soled, low-heeled shoes.  Do not walk in socks, or in shoes and slippers with smooth soles.  If your PCP or therapist recommends using a cane or walker, use it regularly.  Make your home safer.  Increase lighting throughout the house, especially at the top and bottom of stairs.   Ensure lighting is easily turned on when getting up in the middle of the night.  Make sure there are two secure rails on all stairs.  Install grab bars in the bathtub / shower and near the toilet.  Consider using a shower chair and / or a hand-held shower.  Spread sand or salt on icy surfaces.  Beware of wet surfaces, which can be icy.  Tell your PCP if you have fallen.

## 2023-06-05 NOTE — PROGRESS NOTES
Subjective     Kolby Alonso is a 63 y.o. female who presents for an initial annual wellness visit.     Patient Care Team:  Carmen Haque DO as PCP - General (Family Medicine)  Pattie Mota MD as Referring Physician (Obstetrics and Gynecology)  Millie Ellsworth MD as Referring Physician (Gastroenterology)     # Lung nodule  Seen incidentally on coronary artery calcium score  Nodule is 2 cm, no history of smoking    # Osteoporosis  Diagnosed on recent DEXA scan ordered by GYN  Was advised to follow-up with rheumatology for treatment  She is very concerned about risks of bisphosphonates and Prolia    # PMHx  Anxiety-taking Celexa 40 mg daily  HLD-taking atorvastatin 10 mg daily, fish oil, and ezetimibe 10 mg daily; 3 months ago, her LDL was 90 and total cholesterol 167.  Triglycerides 165. Following with Dr. Oquendo.  HTN -taking lisinopril 20 mg daily  GERD- taking Famotidine only, following with GI    GYN is Dr. 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    Comprehensive Medical and Social History  Patient Active Problem List   Diagnosis   • Chronic seasonal allergic rhinitis due to pollen   • Anxiety and depression   • Essential hypertension   • Gastroesophageal reflux disease without esophagitis   • Mixed hyperlipidemia   • Family history of coronary artery disease in father   • Medicare annual wellness visit, initial     Past Medical History:   Diagnosis Date   • Anxiety    • GERD (gastroesophageal reflux disease)    • Hypertension    • Lipid disorder      Past Surgical History:   Procedure Laterality Date   • CHOLECYSTECTOMY     • OOPHORECTOMY     • TUBAL LIGATION       Allergies  "  Allergen Reactions   • Amoxicillin    • Azithromycin    • Erythromycin Base    • Levofloxacin    • Penicillins    • Sulfanilamide    • Tetracycline      Current Outpatient Medications   Medication Sig Dispense Refill   • atorvastatin (LIPITOR) 10 mg tablet Take 10 mg by mouth every evening.     • citalopram (celeXA) 40 mg tablet TAKE 1 TABLET BY MOUTH EVERY DAY 90 tablet 1   • ezetimibe (ZETIA) 10 mg tablet Take 10 mg by mouth every evening.     • FA/mv,Ca,iron,min/lycopene/lut (MULTIVITAL ORAL) No SIG Entered     • famotidine (PEPCID) 10 mg tablet Take 10 mg by mouth 2 (two) times a day. Patient states taking once daily     • fexofenadine-pseudoephedrine (ALLEGRA-D 24 HOUR) 180-240 mg per 24 hr tablet take 1 tablet by oral route  every day on an empty stomach with a glass of water 90 tablet 1   • fluticasone propionate (FLONASE) 50 mcg/actuation nasal spray inhale 1 spray (50MCG)  by intranasal route  every day in each nostril     • lisinopriL (PRINIVIL) 20 mg tablet Take 20 mg by mouth daily.     • omega-3 fatty acids/fish oil (FISH OIL OMEGA 3-6-9 ORAL) No SIG Entered       No current facility-administered medications for this visit.     Social History     Tobacco Use   • Smoking status: Never   • Smokeless tobacco: Never   Substance Use Topics   • Alcohol use: Yes   • Drug use: Never     Family History   Problem Relation Age of Onset   • Heart disease Biological Father    • Hyperlipidemia Biological Father        Objective   Vitals  Vitals:    06/05/23 1304   BP: 120/82   BP Location: Right upper arm   Patient Position: Sitting   Pulse: 86   Resp: 16   Temp: 37.2 °C (98.9 °F)   TempSrc: Temporal   SpO2: 97%   Weight: 67.9 kg (149 lb 12.8 oz)   Height: 1.575 m (5' 2\")     Body mass index is 27.4 kg/m².    ECG 12 LEAD OFFICE PERFORMED    Date/Time: 6/5/2023 4:08 PM    Performed by: Carmen Haque DO  Authorized by: Carmen Haque DO    Interpretation:     Interpretation: normal    Rate:     ECG rate assessment: " normal    Rhythm:     Rhythm: sinus rhythm    QRS:     QRS axis:  Normal  ST segments:     ST segments:  Normal  T waves:     T waves: normal    Q waves:     Abnormal Q-waves: not present           Advanced Care Plan                                        PHQ  Will the patient answer the depression questions?: Yes   Little interest or pleasure in doing things: Not at all   Feeling down, depressed, or hopeless: Not at all   Depression Risk: 0                                               Mini Cog  Completed: Yes  Score: 4  Result: Negative    Get Up and Go  Result: Pass    STEADI Falls Risk  One or more falls in the last year: No           Has trouble stepping up onto a curb: No   Advised to use a cane or walker to get around safely: No   Often has to rush to the toilet: No   Feels unsteady when walking: No   Has lost some feeling in feet: No   Often feels sad or depressed: No   Steadies self on furniture while walking at home: No   Takes medication that makes him/her feel lightheaded or more tired than usual: No   Worried about falling: No   Takes medicine to sleep or improve mood: No   Needs to push with hands when rising from a chair: No   Falls screen completed: Yes     Hearing and Vision Screening  No results found.  See HRA for relevant hearing screening response.    Assessment/Plan   Diagnoses and all orders for this visit:    Medicare annual wellness visit, initial (Primary)  Assessment & Plan:  Last Pap 1/4/2021 neg cotesting  DEXA on 5/12/2023 showed T-scores consistent with osteoporosis  Mammo 1/7/23 negative  Colonoscopies every 5 years, last 10/25/19    EKG today reviewed and unremarkable, no ischemic changes.    Orders:  -     ECG 12 LEAD OFFICE PERFORMED    Essential hypertension  -     Comprehensive metabolic panel; Future    Mixed hyperlipidemia  Assessment & Plan:  3 months ago, her LDL was 90 and total cholesterol 167.  Triglycerides 165. Following with Dr. Oquendo. Will continue atorvastatin 10 mg  daily, fish oil, and ezetimibe 10 mg daily    Orders:  -     Lipid panel; Future    Anxiety and depression  Assessment & Plan:  Anxiety is well controlled on Celexa 40 mg.  We will continue current regimen      Encounter for screening for HIV  -     HIV 1,2 AB P24 AG; Future    Gastroesophageal reflux disease without esophagitis  Assessment & Plan:  Controlled on famotidine once daily.  Following with Dr. Ellsworth      Other orders  -     Pneumococcal conjugate vaccine 20-valent IM      See Patient Instructions (the written plan) which was given to the patient for PPPS and health risk factors with interventions.

## 2023-06-05 NOTE — ASSESSMENT & PLAN NOTE
3 months ago, her LDL was 90 and total cholesterol 167.  Triglycerides 165. Following with Dr. Oquendo. Will continue atorvastatin 10 mg daily, fish oil, and ezetimibe 10 mg daily

## 2023-06-09 ENCOUNTER — TELEPHONE (OUTPATIENT)
Dept: PRIMARY CARE | Facility: CLINIC | Age: 63
End: 2023-06-09
Payer: MEDICARE

## 2023-06-09 NOTE — TELEPHONE ENCOUNTER
Patient LVM she would like a call to discuss what she needs to bring to her new patient appointment.

## 2023-06-20 ENCOUNTER — OFFICE VISIT (OUTPATIENT)
Dept: RHEUMATOLOGY | Facility: CLINIC | Age: 63
End: 2023-06-20
Payer: MEDICARE

## 2023-06-20 VITALS
HEIGHT: 63 IN | RESPIRATION RATE: 16 BRPM | HEART RATE: 95 BPM | WEIGHT: 143 LBS | OXYGEN SATURATION: 97 % | SYSTOLIC BLOOD PRESSURE: 132 MMHG | DIASTOLIC BLOOD PRESSURE: 82 MMHG | BODY MASS INDEX: 25.34 KG/M2

## 2023-06-20 DIAGNOSIS — E83.59 OTHER DISORDERS OF CALCIUM METABOLISM: ICD-10-CM

## 2023-06-20 DIAGNOSIS — M81.0 AGE-RELATED OSTEOPOROSIS WITHOUT CURRENT PATHOLOGICAL FRACTURE: Primary | ICD-10-CM

## 2023-06-20 DIAGNOSIS — M54.50 CHRONIC LOW BACK PAIN WITHOUT SCIATICA, UNSPECIFIED BACK PAIN LATERALITY: ICD-10-CM

## 2023-06-20 DIAGNOSIS — G89.29 CHRONIC LOW BACK PAIN WITHOUT SCIATICA, UNSPECIFIED BACK PAIN LATERALITY: ICD-10-CM

## 2023-06-20 PROCEDURE — 99203 OFFICE O/P NEW LOW 30 MIN: CPT | Performed by: INTERNAL MEDICINE

## 2023-06-20 PROCEDURE — G8752 SYS BP LESS 140: HCPCS | Performed by: INTERNAL MEDICINE

## 2023-06-20 PROCEDURE — G8754 DIAS BP LESS 90: HCPCS | Performed by: INTERNAL MEDICINE

## 2023-06-20 RX ORDER — PSYLLIUM HUSK 0.4 G
600 CAPSULE ORAL DAILY
COMMUNITY

## 2023-06-20 RX ORDER — VITAMIN E (DL,TOCOPHERYL ACET) 22.5 MG/ML
DROPS ORAL DAILY
COMMUNITY
End: 2025-01-20

## 2023-06-20 ASSESSMENT — ENCOUNTER SYMPTOMS
AGITATION: 0
NUMBNESS: 0
BLOOD IN STOOL: 0
FEVER: 0
BACK PAIN: 1
HEADACHES: 0
MYALGIAS: 0
COUGH: 0
FREQUENCY: 0
CONFUSION: 0
NECK PAIN: 0
PHOTOPHOBIA: 0
WEAKNESS: 0
JOINT SWELLING: 0
ABDOMINAL PAIN: 0
ARTHRALGIAS: 0
UNEXPECTED WEIGHT CHANGE: 0
NERVOUS/ANXIOUS: 0
DIARRHEA: 0
BRUISES/BLEEDS EASILY: 0
SHORTNESS OF BREATH: 0
HEMATURIA: 0
DYSURIA: 0
FATIGUE: 1

## 2023-06-20 NOTE — PATIENT INSTRUCTIONS
I will add some labs today to evaluate for any underlying causes of Osteoporosis   Once labs are back and normal, we can discuss medication options   Given your high scores I would recommend Forteo or Evenity  Will discuss with you once labs are back

## 2023-06-20 NOTE — PROGRESS NOTES
Rheumatology                       Initial visit                Reason for visit:   Chief Complaint   Patient presents with   • new pt / Dr. Hedrick gyn Osteoporosis       HPI     Kolby Alonso is a 63 y.o. female referred by their PCP for evaluation of Osteoporosis    Patient is accompanied by her   Patient had a DEXA scan done on 5/12/23 which showed osteoporosis ( lumbar spine T score -3.3)  Patient has never been treated for osteoporosis before  Last year she tripped and fell and broke a small bone in right foot   Denies any recent frcatures  Denies smoking history  Denies any previous use of glucocorticoids  Admits to physical exercise  Denies any history of esophageal disorders, bariatric surgery, chronic kidney disease, renal stones   on daily calcium and vitamin D     family history of osteopenia in Mother       Minor joint pains without any joint swellings   Has chronic low back pain       DEXA scan/BMD over years :      Date                           Lumbar spine                                 Left femur/hip                                     Right femur/hip    5/12/23                          -3.3                                                     -2.6                                                       -2.5                                       Past Medical History:   Diagnosis Date   • Anxiety    • GERD (gastroesophageal reflux disease)    • Hypertension    • Lipid disorder        Past Surgical History:   Procedure Laterality Date   • CHOLECYSTECTOMY     • OOPHORECTOMY     • TUBAL LIGATION         Social History     Tobacco Use   • Smoking status: Never   • Smokeless tobacco: Never   Substance Use Topics   • Alcohol use: Yes   • Drug use: Never       Family History   Problem Relation Age of Onset   • Heart disease Biological Father    • Hyperlipidemia Biological Father        Allergies:  Amoxicillin, Azithromycin, Erythromycin base, Levofloxacin, Penicillins,  Sulfanilamide, and Tetracycline    Current Outpatient Medications   Medication Sig Dispense Refill   • atorvastatin (LIPITOR) 10 mg tablet Take 10 mg by mouth every evening.     • calcium citrate-vitamin D3 (CITRACAL) 200 mg-6.25 mcg (250 unit) tablet Take 600 tablets by mouth daily.     • citalopram (celeXA) 40 mg tablet TAKE 1 TABLET BY MOUTH EVERY DAY 90 tablet 1   • ezetimibe (ZETIA) 10 mg tablet Take 10 mg by mouth every evening.     • FA/mv,Ca,iron,min/lycopene/lut (MULTIVITAL ORAL) No SIG Entered     • famotidine (PEPCID) 10 mg tablet Take 10 mg by mouth 2 (two) times a day. Patient states taking once daily     • fexofenadine-pseudoephedrine (ALLEGRA-D 24 HOUR) 180-240 mg per 24 hr tablet take 1 tablet by oral route  every day on an empty stomach with a glass of water 90 tablet 1   • fluticasone propionate (FLONASE) 50 mcg/actuation nasal spray inhale 1 spray (50MCG)  by intranasal route  every day in each nostril     • lisinopriL (PRINIVIL) 20 mg tablet Take 20 mg by mouth daily.     • omega-3 fatty acids/fish oil (FISH OIL OMEGA 3-6-9 ORAL) No SIG Entered     • vitamin E, dl, acetate, 22.5 mg/mL drops Take by mouth daily.       No current facility-administered medications for this visit.       Review of Systems   Constitutional: Positive for fatigue. Negative for fever and unexpected weight change.   HENT: Negative for hearing loss, mouth sores and nosebleeds.    Eyes: Negative for photophobia and visual disturbance.   Respiratory: Negative for cough and shortness of breath.    Cardiovascular: Negative for chest pain and leg swelling.   Gastrointestinal: Negative for abdominal pain, blood in stool and diarrhea.   Genitourinary: Negative for dysuria, frequency and hematuria.   Musculoskeletal: Positive for back pain. Negative for arthralgias, joint swelling, myalgias and neck pain.   Skin: Negative for rash.   Neurological: Negative for weakness, numbness and headaches.   Hematological: Does not bruise/bleed  easily.   Psychiatric/Behavioral: Negative for agitation and confusion. The patient is not nervous/anxious.         Physical Exam  Constitutional:       Appearance: Normal appearance. She is normal weight.   HENT:      Mouth/Throat:      Mouth: Mucous membranes are moist.      Pharynx: Oropharynx is clear.   Eyes:      Extraocular Movements: Extraocular movements intact.      Conjunctiva/sclera: Conjunctivae normal.   Cardiovascular:      Rate and Rhythm: Normal rate and regular rhythm.      Pulses: Normal pulses.      Heart sounds: Normal heart sounds.   Pulmonary:      Effort: Pulmonary effort is normal.      Breath sounds: Normal breath sounds. No wheezing.   Abdominal:      General: Bowel sounds are normal.      Palpations: Abdomen is soft.      Tenderness: There is no abdominal tenderness.   Musculoskeletal:         General: No swelling or tenderness. Normal range of motion.      Cervical back: Normal range of motion and neck supple.      Right lower leg: No edema.      Left lower leg: No edema.   Skin:     General: Skin is warm.      Findings: No rash.   Neurological:      General: No focal deficit present.      Mental Status: She is alert and oriented to person, place, and time.      Motor: No weakness.   Psychiatric:         Mood and Affect: Mood normal.         Behavior: Behavior normal.          Vitals:    06/20/23 1347   BP: 132/82   Pulse: 95   Resp: 16   SpO2: 97%       Wt Readings from Last 3 Encounters:   06/20/23 64.9 kg (143 lb)   06/05/23 67.9 kg (149 lb 12.8 oz)   12/22/22 68.6 kg (151 lb 3.2 oz)       Body mass index is 25.33 kg/m².    Auto-immune panel  No results found for: JASKARAN, RF, CCP, DSDNAAB, SMITHRNP, SCL70, CENTBAB, JO1, SSAAB, SSBAB, RNAPOLY     Hematology  Lab Results   Component Value Date    WBC 5.1 05/13/2022    HGB 13.0 05/13/2022    HCT 38.8 05/13/2022     05/13/2022       Chemistries  Lab Results   Component Value Date     05/13/2022    K 4.4 05/13/2022      05/13/2022    CREATININE 0.79 05/13/2022    BUN 14 05/13/2022    CO2 29 05/13/2022    GLUCOSE 86 05/13/2022    CALCIUM 9.4 05/13/2022    MG 2.2 11/07/2017    ALT 17 03/07/2023    AST 17 03/07/2023       Cholesterol  Lab Results   Component Value Date    CHOL 167 03/07/2023    TRIG 165 (H) 03/07/2023    HDL 51 03/07/2023    LDLCALC 90 03/07/2023       Endocrine  Lab Results   Component Value Date    TSH 2.04 05/13/2022         Assessment/Plan     Kolby Alonso is a 63 y.o. female with Past Medical History of hyperlipidemia, GERD, hypertension, anxiety   Is here for evaluation of osteoporosis    1. Age-related osteoporosis without current pathological fracture  Newly diagnosed osteoporosis based on the DEXA scan done on 5/12/23  Worse BMD in lumbar spine with a T score of -3.3  No recent fractures  Never been on any medications for osteoporosis    Had a lengthy discussion with patient about the diagnosis and management strategies  Will do relevant labs to evaluate for any underlying metabolic causes.  Once labs are evaluated, will call patient and discuss medication  Answered all questions patient and her  had    Given that she has severe osteoporosis with a T score of -3.3, she will benefit from an anabolic agent like teriparatide or romosozumab    Recommend taking daily calcium approximately 1200 mg (either from dietary intake or through supplements) and daily vitamin D of at least 1000 to 2000 international units  Encourage weightbearing exercises for at least 30 minutes on most days of the week and incorporate muscle-strengthening and posture exercises two to three days a week    - Vitamin D 25 hydroxy; Future  - TSH w reflex FT4; Future  - PTH, intact; Future  - Phosphorus; Future      2. Chronic low back pain without sciatica, unspecified back pain laterality  Mild chronic low back pain without any radiculopathy  No acute intervention at this time    3. Other disorders of calcium metabolism  Labs to  evaluate for any underlying causes for osteoporosis  - TSH w reflex FT4; Future  - TSH w reflex FT4                   Jodie Velez MD  6/20/2023

## 2023-06-22 LAB
25(OH)D3 SERPL-MCNC: 72 NG/ML (ref 30–100)
PHOSPHATE SERPL-MCNC: 4 MG/DL (ref 2.1–4.3)
PTH-INTACT SERPL-MCNC: 40 PG/ML (ref 16–77)
TSH SERPL-ACNC: 2.56 MIU/L (ref 0.4–4.5)

## 2023-06-29 ENCOUNTER — TELEPHONE (OUTPATIENT)
Dept: RHEUMATOLOGY | Facility: CLINIC | Age: 63
End: 2023-06-29
Payer: MEDICARE

## 2023-06-29 RX ORDER — TERIPARATIDE 250 UG/ML
20 INJECTION, SOLUTION SUBCUTANEOUS DAILY
Qty: 1 ML | Refills: 0 | Status: SHIPPED | OUTPATIENT
Start: 2023-06-29 | End: 2023-09-27

## 2023-06-29 NOTE — TELEPHONE ENCOUNTER
Patient returned your call from around 2:00pm today. Please call her on her cell phone # at [phone] as she is on her way out of own.    Thank you!

## 2023-06-29 NOTE — TELEPHONE ENCOUNTER
Called and discussed with patient her lab results and medication options     Given the family history of heart disease in her father, she wants to avoid Evenity    Discussed about Forteo (Teriparatide) 20 mcg subcutaneous injections daily   Medication related side effects loan    Will submit PA for  medication

## 2023-07-03 ENCOUNTER — TELEPHONE (OUTPATIENT)
Dept: RHEUMATOLOGY | Facility: CLINIC | Age: 63
End: 2023-07-03
Payer: MEDICARE

## 2023-07-03 RX ORDER — TERIPARATIDE 250 UG/ML
20 INJECTION, SOLUTION SUBCUTANEOUS DAILY
Qty: 7.2 ML | Refills: 3 | Status: SHIPPED
Start: 2023-07-03 | End: 2024-01-30 | Stop reason: DRUGHIGH

## 2023-07-17 ENCOUNTER — TELEPHONE (OUTPATIENT)
Dept: FAMILY MEDICINE | Facility: CLINIC | Age: 63
End: 2023-07-17
Payer: MEDICARE

## 2023-07-17 RX ORDER — CITALOPRAM 40 MG/1
40 TABLET, FILM COATED ORAL DAILY
Qty: 90 TABLET | Refills: 1 | Status: SHIPPED | OUTPATIENT
Start: 2023-07-17 | End: 2024-01-18 | Stop reason: SDUPTHER

## 2023-07-17 NOTE — TELEPHONE ENCOUNTER
Patient called to request a 90 day refill of the following medication:    Citalopram 40mg    Pharmacy is confirmed to be [facility].    Please call patient with any questions at [phone]    Thank you!  Jaleesa

## 2023-08-01 ENCOUNTER — OFFICE VISIT (OUTPATIENT)
Dept: RHEUMATOLOGY | Facility: CLINIC | Age: 63
End: 2023-08-01
Payer: MEDICARE

## 2023-08-01 VITALS
OXYGEN SATURATION: 98 % | DIASTOLIC BLOOD PRESSURE: 72 MMHG | HEART RATE: 78 BPM | SYSTOLIC BLOOD PRESSURE: 126 MMHG | RESPIRATION RATE: 16 BRPM

## 2023-08-01 DIAGNOSIS — M81.0 AGE-RELATED OSTEOPOROSIS WITHOUT CURRENT PATHOLOGICAL FRACTURE: Primary | ICD-10-CM

## 2023-08-01 DIAGNOSIS — Z79.899 HIGH RISK MEDICATION USE: ICD-10-CM

## 2023-08-01 PROCEDURE — G8752 SYS BP LESS 140: HCPCS | Performed by: INTERNAL MEDICINE

## 2023-08-01 PROCEDURE — G8754 DIAS BP LESS 90: HCPCS | Performed by: INTERNAL MEDICINE

## 2023-08-01 PROCEDURE — 99213 OFFICE O/P EST LOW 20 MIN: CPT | Performed by: INTERNAL MEDICINE

## 2023-08-01 ASSESSMENT — ENCOUNTER SYMPTOMS
DYSURIA: 0
SHORTNESS OF BREATH: 0
NERVOUS/ANXIOUS: 0
CONFUSION: 0
JOINT SWELLING: 0
WEAKNESS: 0
FATIGUE: 1
HEMATURIA: 0
UNEXPECTED WEIGHT CHANGE: 0
BRUISES/BLEEDS EASILY: 0
MYALGIAS: 0
NECK PAIN: 0
DIARRHEA: 0
HEADACHES: 0
ABDOMINAL PAIN: 0
AGITATION: 0
FEVER: 0
BACK PAIN: 1
FREQUENCY: 0
ARTHRALGIAS: 0
COUGH: 0
BLOOD IN STOOL: 0
NUMBNESS: 0

## 2023-08-01 NOTE — PROGRESS NOTES
Rheumatology                       Initial visit                Reason for visit:   Chief Complaint   Patient presents with   • f/u osteoporosis       HPI     Kolby Alonso is a 63 y.o. female here for follow up of Osteoporosis      Interval history:  ------------------------  Patient is accompanied by her   After last visit, patient was prescribed Teriparatide daily shots.  She started the Forteo injections about 3 weeks back   Has some bruising on the stomach. She spoke to the Forteo patient advisory who recommended trying it on thigh which worked   She feels fine except some achiness in the ankles   Denies any other joint pains/swelling   on daily calcium and vitamin D    History:  -------------  Initially seen in clinic on 6/20/23  DEXA scan done on 5/12/23 which showed osteoporosis ( lumbar spine T score -3.3)  Patient has never been treated for osteoporosis before  Last year she tripped and fell and broke a small bone in right foot   Denies smoking history  Denies any previous use of glucocorticoids  Admits to physical exercise  Denies any history of esophageal disorders, bariatric surgery, chronic kidney disease, renal stones     family history of osteopenia in Mother         DEXA scan/BMD over years :      Date                           Lumbar spine                                 Left femur/hip                                     Right femur/hip    5/12/23                          -3.3                                                     -2.6                                                       -2.5                   LABS :  ------------  6/21/23 : Normal PTH/Vitamin D/Phosphate/TSH                          Past Medical History:   Diagnosis Date   • Anxiety    • GERD (gastroesophageal reflux disease)    • Hypertension    • Lipid disorder        Past Surgical History:   Procedure Laterality Date   • CHOLECYSTECTOMY     • OOPHORECTOMY     • TUBAL LIGATION         Social History      Tobacco Use   • Smoking status: Never   • Smokeless tobacco: Never   Substance Use Topics   • Alcohol use: Yes   • Drug use: Never       Family History   Problem Relation Age of Onset   • Heart disease Biological Father    • Hyperlipidemia Biological Father        Allergies:  Amoxicillin, Azithromycin, Erythromycin base, Levofloxacin, Penicillins, Sulfanilamide, and Tetracycline    Current Outpatient Medications   Medication Sig Dispense Refill   • atorvastatin (LIPITOR) 10 mg tablet Take 10 mg by mouth every evening.     • calcium citrate-vitamin D3 (CITRACAL) 200 mg-6.25 mcg (250 unit) tablet Take 600 tablets by mouth daily.     • citalopram (celeXA) 40 mg tablet Take 1 tablet (40 mg total) by mouth daily. 90 tablet 1   • ezetimibe (ZETIA) 10 mg tablet Take 10 mg by mouth every evening.     • FA/mv,Ca,iron,min/lycopene/lut (MULTIVITAL ORAL) No SIG Entered     • famotidine (PEPCID) 10 mg tablet Take 10 mg by mouth 2 (two) times a day. Patient states taking once daily     • fexofenadine-pseudoephedrine (ALLEGRA-D 24 HOUR) 180-240 mg per 24 hr tablet take 1 tablet by oral route  every day on an empty stomach with a glass of water 90 tablet 1   • fluticasone propionate (FLONASE) 50 mcg/actuation nasal spray inhale 1 spray (50MCG)  by intranasal route  every day in each nostril     • lisinopriL (PRINIVIL) 20 mg tablet Take 20 mg by mouth daily.     • omega-3 fatty acids/fish oil (FISH OIL OMEGA 3-6-9 ORAL) No SIG Entered     • teriparatide (FORTEO) 20 mcg/dose (600mcg/2.4mL) injection Inject 0.08 mL (20 mcg total) under the skin daily Indications: osteoporosis in postmenopausal woman at high risk for fracture, Osteoporosis (Dx M81.0). 7.2 mL 3   • teriparatide 20 mcg/dose (620mcg/2.48mL) pen injector Inject 20 mcg under the skin daily. 1 mL 0   • vitamin E, dl, acetate, 22.5 mg/mL drops Take by mouth daily.       No current facility-administered medications for this visit.       Review of Systems   Constitutional:  Positive for fatigue. Negative for fever and unexpected weight change.   HENT: Negative for hearing loss, mouth sores and nosebleeds.    Eyes: Negative for visual disturbance.   Respiratory: Negative for cough and shortness of breath.    Cardiovascular: Negative for chest pain and leg swelling.   Gastrointestinal: Negative for abdominal pain, blood in stool and diarrhea.   Genitourinary: Negative for dysuria, frequency and hematuria.   Musculoskeletal: Positive for back pain. Negative for arthralgias, joint swelling, myalgias and neck pain.   Skin: Negative for rash.   Neurological: Negative for weakness, numbness and headaches.   Hematological: Does not bruise/bleed easily.   Psychiatric/Behavioral: Negative for agitation and confusion. The patient is not nervous/anxious.         Physical Exam  Constitutional:       Appearance: Normal appearance. She is normal weight.   HENT:      Mouth/Throat:      Pharynx: Oropharynx is clear.   Eyes:      Conjunctiva/sclera: Conjunctivae normal.   Cardiovascular:      Rate and Rhythm: Normal rate and regular rhythm.      Pulses: Normal pulses.      Heart sounds: Normal heart sounds.   Pulmonary:      Effort: Pulmonary effort is normal.      Breath sounds: Normal breath sounds. No wheezing.   Abdominal:      General: Bowel sounds are normal.      Palpations: Abdomen is soft.      Tenderness: There is no abdominal tenderness.   Musculoskeletal:         General: No swelling or tenderness. Normal range of motion.      Cervical back: Normal range of motion and neck supple.      Right lower leg: No edema.      Left lower leg: No edema.   Skin:     General: Skin is warm.      Findings: Bruising present. No rash.      Comments: Mild bruising on both sides of umbilicus without any underlying hematoma    Neurological:      General: No focal deficit present.      Mental Status: She is alert and oriented to person, place, and time.      Motor: No weakness.   Psychiatric:         Mood and  Affect: Mood normal.         Behavior: Behavior normal.          Vitals:    08/01/23 1339   BP: 126/72   Pulse: 78   Resp: 16   SpO2: 98%       Wt Readings from Last 3 Encounters:   06/20/23 64.9 kg (143 lb)   06/05/23 67.9 kg (149 lb 12.8 oz)   12/22/22 68.6 kg (151 lb 3.2 oz)       There is no height or weight on file to calculate BMI.    Auto-immune panel  No results found for: JASKARAN, RF, CCP, DSDNAAB, SMITHRNP, SCL70, CENTBAB, JO1, SSAAB, SSBAB, RNAPOLY     Hematology  Lab Results   Component Value Date    WBC 5.1 05/13/2022    HGB 13.0 05/13/2022    HCT 38.8 05/13/2022     05/13/2022       Chemistries  Lab Results   Component Value Date     05/13/2022    K 4.4 05/13/2022     05/13/2022    CREATININE 0.79 05/13/2022    BUN 14 05/13/2022    CO2 29 05/13/2022    GLUCOSE 86 05/13/2022    CALCIUM 9.4 05/13/2022    MG 2.2 11/07/2017    ALT 17 03/07/2023    AST 17 03/07/2023       Cholesterol  Lab Results   Component Value Date    CHOL 167 03/07/2023    TRIG 165 (H) 03/07/2023    HDL 51 03/07/2023    LDLCALC 90 03/07/2023       Endocrine  Lab Results   Component Value Date    TSH 2.56 06/21/2023         Assessment/Plan     Kolby Alonso is a 63 y.o. female with Past Medical History of hyperlipidemia, GERD, hypertension, anxiety   Is here for follow up  of osteoporosis    1. Age-related osteoporosis without current pathological fracture  Osteoporosis diagnosed earlier this year based on the DEXA scan done on 5/12/23  Worse BMD in lumbar spine with a T score of -3.3  No recent fractures  Never been on any medications for osteoporosis prior to this     Started Forteo/Teripartaide daily SQ injections in early July  Tolerating the medication well  Continue current regimen     Recommend taking daily calcium approximately 1200 mg (either from dietary intake or through supplements) and daily vitamin D of at least 1000 to 2000 international units  Encourage weightbearing exercises for at least 30 minutes on  most days of the week and incorporate muscle-strengthening and posture exercises two to three days a week    2. High risk medication use  Follow up labs   - Comprehensive metabolic panel; Future  - Comprehensive metabolic panel                     Jodie Velez MD  8/1/2023

## 2023-08-09 LAB
ALBUMIN SERPL-MCNC: 4.2 G/DL (ref 3.6–5.1)
ALBUMIN/GLOB SERPL: 1.8 (CALC) (ref 1–2.5)
ALP SERPL-CCNC: 82 U/L (ref 37–153)
ALT SERPL-CCNC: 20 U/L (ref 6–29)
AST SERPL-CCNC: 18 U/L (ref 10–35)
BILIRUB SERPL-MCNC: 0.4 MG/DL (ref 0.2–1.2)
BUN SERPL-MCNC: 16 MG/DL (ref 7–25)
BUN/CREAT SERPL: NORMAL (CALC) (ref 6–22)
CALCIUM SERPL-MCNC: 9.7 MG/DL (ref 8.6–10.4)
CHLORIDE SERPL-SCNC: 106 MMOL/L (ref 98–110)
CHOLEST SERPL-MCNC: 162 MG/DL
CHOLEST/HDLC SERPL: 3.7 (CALC)
CO2 SERPL-SCNC: 25 MMOL/L (ref 20–32)
CREAT SERPL-MCNC: 0.76 MG/DL (ref 0.5–1.05)
EGFRCR SERPLBLD CKD-EPI 2021: 87 ML/MIN/1.73M2
GLOBULIN SER CALC-MCNC: 2.3 G/DL (CALC) (ref 1.9–3.7)
GLUCOSE SERPL-MCNC: 88 MG/DL (ref 65–99)
HDLC SERPL-MCNC: 44 MG/DL
HIV 1+2 AB+HIV1 P24 AG SERPL QL IA: NORMAL
LDLC SERPL CALC-MCNC: 86 MG/DL (CALC)
Lab: NORMAL
NONHDLC SERPL-MCNC: 118 MG/DL (CALC)
POTASSIUM SERPL-SCNC: 4.5 MMOL/L (ref 3.5–5.3)
PROT SERPL-MCNC: 6.5 G/DL (ref 6.1–8.1)
QUEST COMMENT: NORMAL
QUEST: NORMAL
SODIUM SERPL-SCNC: 139 MMOL/L (ref 135–146)
TRIGL SERPL-MCNC: 220 MG/DL

## 2023-11-30 ENCOUNTER — OFFICE VISIT (OUTPATIENT)
Dept: FAMILY MEDICINE | Facility: CLINIC | Age: 63
End: 2023-11-30
Payer: MEDICARE

## 2023-11-30 VITALS
WEIGHT: 149.2 LBS | HEART RATE: 96 BPM | BODY MASS INDEX: 27.46 KG/M2 | TEMPERATURE: 97 F | RESPIRATION RATE: 14 BRPM | OXYGEN SATURATION: 99 % | HEIGHT: 62 IN | DIASTOLIC BLOOD PRESSURE: 80 MMHG | SYSTOLIC BLOOD PRESSURE: 132 MMHG

## 2023-11-30 DIAGNOSIS — B35.4 TINEA CORPORIS: Primary | ICD-10-CM

## 2023-11-30 DIAGNOSIS — I10 ESSENTIAL HYPERTENSION: ICD-10-CM

## 2023-11-30 DIAGNOSIS — E78.2 MIXED HYPERLIPIDEMIA: ICD-10-CM

## 2023-11-30 DIAGNOSIS — M81.0 AGE-RELATED OSTEOPOROSIS WITHOUT CURRENT PATHOLOGICAL FRACTURE: ICD-10-CM

## 2023-11-30 PROCEDURE — G8754 DIAS BP LESS 90: HCPCS | Performed by: STUDENT IN AN ORGANIZED HEALTH CARE EDUCATION/TRAINING PROGRAM

## 2023-11-30 PROCEDURE — G8752 SYS BP LESS 140: HCPCS | Performed by: STUDENT IN AN ORGANIZED HEALTH CARE EDUCATION/TRAINING PROGRAM

## 2023-11-30 PROCEDURE — 99214 OFFICE O/P EST MOD 30 MIN: CPT | Performed by: STUDENT IN AN ORGANIZED HEALTH CARE EDUCATION/TRAINING PROGRAM

## 2023-11-30 RX ORDER — DOXYLAMINE SUCCINATE 25 MG
1 TABLET ORAL 2 TIMES DAILY
Qty: 28.35 G | Refills: 0 | Status: SHIPPED | OUTPATIENT
Start: 2023-11-30 | End: 2024-11-29

## 2023-12-08 ENCOUNTER — OFFICE VISIT (OUTPATIENT)
Dept: RHEUMATOLOGY | Facility: CLINIC | Age: 63
End: 2023-12-08
Payer: MEDICARE

## 2023-12-08 VITALS — SYSTOLIC BLOOD PRESSURE: 99 MMHG | HEART RATE: 90 BPM | DIASTOLIC BLOOD PRESSURE: 70 MMHG | OXYGEN SATURATION: 98 %

## 2023-12-08 DIAGNOSIS — Z71.89 INJECTION EDUCATION, ENCOUNTER FOR: ICD-10-CM

## 2023-12-08 DIAGNOSIS — Z51.81 ENCOUNTER FOR MONITORING TERIPARATIDE THERAPY: ICD-10-CM

## 2023-12-08 DIAGNOSIS — Z79.899 HIGH RISK MEDICATION USE: ICD-10-CM

## 2023-12-08 DIAGNOSIS — Z79.899 ENCOUNTER FOR MONITORING TERIPARATIDE THERAPY: ICD-10-CM

## 2023-12-08 DIAGNOSIS — M81.0 AGE-RELATED OSTEOPOROSIS WITHOUT CURRENT PATHOLOGICAL FRACTURE: Primary | ICD-10-CM

## 2023-12-08 PROCEDURE — G8754 DIAS BP LESS 90: HCPCS | Performed by: STUDENT IN AN ORGANIZED HEALTH CARE EDUCATION/TRAINING PROGRAM

## 2023-12-08 PROCEDURE — G8752 SYS BP LESS 140: HCPCS | Performed by: STUDENT IN AN ORGANIZED HEALTH CARE EDUCATION/TRAINING PROGRAM

## 2023-12-08 PROCEDURE — 99214 OFFICE O/P EST MOD 30 MIN: CPT | Performed by: STUDENT IN AN ORGANIZED HEALTH CARE EDUCATION/TRAINING PROGRAM

## 2023-12-08 NOTE — PATIENT INSTRUCTIONS
Your back appears to be fine. It has to do with our muscle composition in our back as we age - It's nothing pathological    In terms of your injection with Forteo, alternate between sites of increased fatty tissue  Try not to inject over muscles as it can injure nearby blood vessels which is what I think happened to your left thigh  Also, consider ice-ing your injection site before your inject and allowing the Forteo to hit room temperature (eg leave it out for 5 mins)  Maintain Vitamin D 2000IU daily  Let's get labs when you can - Be sure to fast as we are collecting cholesterol    Let's come back in 4-6 months

## 2023-12-08 NOTE — PROGRESS NOTES
RHEUMATOLOGY CLINIC- FOLLOW-UP   12/08/23     PROBLEM LIST  1. Osteoporosis  -Est care with [facility] 2023 for osteoporosis. No prior tx  -DEXA 5/2023 ([facility]): L spine T-3.3, L femoral neck T-2.6, R femoral neck T-2.5  -Tx: Forteo (2023-)  2. Depression  3. HTN  4. HLD    HPI/INTERVAL HISTORY   Ms. Alonso is a 63 y.o. female with hx of osteoporosis on Forteo therapy who presents for f/u  -Accompanied by  today. Ok to speak in front of him  -Doing overall well. Mentioned that she injected into her thighs last night and noticed some redness with a bump that formed. She has been following the directions as listed on the packet but finds that the sites of administration are limited as it largely tells you to inject into the thighs and/or lower abdomen  -She also mentioned some discomfort when injecting Forteo. She does inject it cold from the fridge  -Remains on Vitamin D 2000IU daily    REVIEW OF HISTORY   A 10 point review of systems was reviewed with the patient. Pertinent positives are as noted. All others systems are negative.    ALLERGIES   Amoxicillin, Azithromycin, Erythromycin base, Levofloxacin, Penicillins, Sulfanilamide, and Tetracycline    MEDICATIONS   has a current medication list which includes the following prescription(s): atorvastatin, calcium citrate-vitamin d3, citalopram, ezetimibe, fa/mv,ca,iron,min/lycopene/lut, famotidine, fexofenadine-pseudoephedrine, fluticasone propionate, lisinopril, miconazole, omega-3 fatty acids/fish oil, teriparatide, and vitamin e (dl, acetate).    PHYSICAL EXAM   Vitals:    12/08/23 0930   BP: 99/70   BP Location: Left upper arm   Patient Position: Sitting   Pulse: 90   SpO2: 98%       GEN  NAD, sitting comfortably upright in chair  EYES     No conjunctivitis, no scleral icterus  HEENT              No pallor, no alopecia, no facial rashes  LUNGS   CTAB. No wheezing, rhonchi.  CARDIO         RRR. S1 S2. No loud M/R/G  GI             Soft, non tender, non  distended. Bruising seen in LLQ spanning approx 1cm  NEURO  Alert and oriented x3. Non-focal.   SKIN  No rashes, no livedo reticularis, no nodules. Slight erythema w/ ill circumscribed nodule spanning 1cm in L anterior thigh (evolving hematoma?)  MSK                     Handgrip normal. No deformities of the hand. [place], Doroteo nodes throughout. Wrist and elbows with preserved ROM     LABS/IMAGING  Pertinent labs and imaging reviewed.      ASSESSMENT & PLAN   This is a 63 y.o. female with hx of osteoporosis on Forteo therapy who presents for f/u. Remains on Forteo therapy for now but has some concerns regarding the subcutaneous injection. Discussed with patient that SQ injections can be given anywhere there is adipose tissue incl the abdominal wall, inner thighs and to avoid delivery into musculature which is likely what happened yesterday prompting a suspected hematoma overlying her L anterior thigh. She was also alerted to consider injecting at a 45 degree angle w/ bevel up. She expressed understanding.    #Osteoporosis  #Medication monitoring  #Teriparatide monitoring  #Injection education  -SQ injection education given today. Advised to consider warming Forteo to room temp to avoid discomfort w/ temp gradient  -C/w Forteo as is  -Labs today for drug monitoring. Advised to fast as lipid panelw ill be collected  -C/w Vitamin D 2000IU daily as is  -RTC 4-6mo for surveillance    I personally spent a total visit time of 37 minutes for Kolby Alonso on this date of service. My evaluation of this patient includes a review of the chart and notes written by other providers, history, laboratory, imaging, discussion with the patient, coordination of care with other providers, placing of orders, and documentation of the plan as detailed above.       Katie Grady MD  Rheumatology     Orders Placed This Encounter   Procedures   • Comprehensive metabolic panel   • CBC   • Lipid panel   • PTH, intact

## 2023-12-12 LAB
ALBUMIN SERPL-MCNC: 4.5 G/DL (ref 3.6–5.1)
ALBUMIN/GLOB SERPL: 1.9 (CALC) (ref 1–2.5)
ALP SERPL-CCNC: 138 U/L (ref 37–153)
ALT SERPL-CCNC: 22 U/L (ref 6–29)
AST SERPL-CCNC: 20 U/L (ref 10–35)
BILIRUB SERPL-MCNC: 0.6 MG/DL (ref 0.2–1.2)
BUN SERPL-MCNC: 18 MG/DL (ref 7–25)
BUN/CREAT SERPL: ABNORMAL (CALC) (ref 6–22)
CALCIUM SERPL-MCNC: 10.5 MG/DL (ref 8.6–10.4)
CHLORIDE SERPL-SCNC: 104 MMOL/L (ref 98–110)
CO2 SERPL-SCNC: 30 MMOL/L (ref 20–32)
CREAT SERPL-MCNC: 0.77 MG/DL (ref 0.5–1.05)
EGFRCR SERPLBLD CKD-EPI 2021: 86 ML/MIN/1.73M2
ERYTHROCYTE [DISTWIDTH] IN BLOOD BY AUTOMATED COUNT: 12 % (ref 11–15)
GLOBULIN SER CALC-MCNC: 2.4 G/DL (CALC) (ref 1.9–3.7)
GLUCOSE SERPL-MCNC: 98 MG/DL (ref 65–99)
HCT VFR BLD AUTO: 39 % (ref 35–45)
HGB BLD-MCNC: 12.8 G/DL (ref 11.7–15.5)
MCH RBC QN AUTO: 29.2 PG (ref 27–33)
MCHC RBC AUTO-ENTMCNC: 32.8 G/DL (ref 32–36)
MCV RBC AUTO: 88.8 FL (ref 80–100)
PLATELET # BLD AUTO: 260 THOUSAND/UL (ref 140–400)
PMV BLD REES-ECKER: 10.4 FL (ref 7.5–12.5)
POTASSIUM SERPL-SCNC: 5.3 MMOL/L (ref 3.5–5.3)
PROT SERPL-MCNC: 6.9 G/DL (ref 6.1–8.1)
RBC # BLD AUTO: 4.39 MILLION/UL (ref 3.8–5.1)
SODIUM SERPL-SCNC: 139 MMOL/L (ref 135–146)
WBC # BLD AUTO: 4.8 THOUSAND/UL (ref 3.8–10.8)

## 2024-01-18 ENCOUNTER — TELEPHONE (OUTPATIENT)
Dept: FAMILY MEDICINE | Facility: CLINIC | Age: 64
End: 2024-01-18
Payer: MEDICARE

## 2024-01-18 RX ORDER — CITALOPRAM 40 MG/1
40 TABLET, FILM COATED ORAL DAILY
Qty: 90 TABLET | Refills: 0 | Status: SHIPPED | OUTPATIENT
Start: 2024-01-18 | End: 2024-04-01

## 2024-01-18 NOTE — TELEPHONE ENCOUNTER
Patient called to request a 90 day refill of the following medication:    Citalopram 40mg    [facility]    Please call patient with any questions at [phone]    Thank you!  Jaleesa

## 2024-01-30 RX ORDER — TERIPARATIDE 250 UG/ML
20 INJECTION, SOLUTION SUBCUTANEOUS DAILY
Qty: 2.48 ML | Refills: 90 | Status: SHIPPED | OUTPATIENT
Start: 2024-01-30 | End: 2024-04-29

## 2024-04-01 RX ORDER — CITALOPRAM 40 MG/1
40 TABLET, FILM COATED ORAL DAILY
Qty: 90 TABLET | Refills: 0 | Status: SHIPPED | OUTPATIENT
Start: 2024-04-01 | End: 2024-07-02

## 2024-05-21 ENCOUNTER — OFFICE VISIT (OUTPATIENT)
Dept: FAMILY MEDICINE | Facility: CLINIC | Age: 64
End: 2024-05-21
Payer: MEDICARE

## 2024-05-21 VITALS
WEIGHT: 150 LBS | DIASTOLIC BLOOD PRESSURE: 88 MMHG | OXYGEN SATURATION: 97 % | RESPIRATION RATE: 16 BRPM | HEIGHT: 62 IN | BODY MASS INDEX: 27.6 KG/M2 | TEMPERATURE: 97.9 F | SYSTOLIC BLOOD PRESSURE: 132 MMHG | HEART RATE: 97 BPM

## 2024-05-21 DIAGNOSIS — N89.8 VAGINAL IRRITATION: Primary | ICD-10-CM

## 2024-05-21 DIAGNOSIS — R10.2 SUPRAPUBIC PAIN: ICD-10-CM

## 2024-05-21 PROBLEM — B35.4 TINEA CORPORIS: Status: RESOLVED | Noted: 2023-11-30 | Resolved: 2024-05-21

## 2024-05-21 LAB
BILIRUBIN, POC: NEGATIVE
BLOOD URINE, POC: NEGATIVE
CLARITY, POC: CLEAR
COLOR, POC: YELLOW
EXPIRATION DATE: NORMAL
GLUCOSE URINE, POC: NEGATIVE
KETONES, POC: NEGATIVE
LEUKOCYTE EST, POC: NEGATIVE
Lab: NORMAL
NITRITE, POC: NEGATIVE
PH, POC: 5
POCT MANUFACTURER: NORMAL
PROTEIN, POC: NEGATIVE
SPECIFIC GRAVITY, POC: 1
UROBILINOGEN, POC: 0.2

## 2024-05-21 PROCEDURE — G8752 SYS BP LESS 140: HCPCS | Performed by: NURSE PRACTITIONER

## 2024-05-21 PROCEDURE — 99214 OFFICE O/P EST MOD 30 MIN: CPT | Performed by: NURSE PRACTITIONER

## 2024-05-21 PROCEDURE — G8754 DIAS BP LESS 90: HCPCS | Performed by: NURSE PRACTITIONER

## 2024-05-21 PROCEDURE — 81002 URINALYSIS NONAUTO W/O SCOPE: CPT | Performed by: NURSE PRACTITIONER

## 2024-05-21 RX ORDER — ATORVASTATIN CALCIUM 20 MG/1
20 TABLET, FILM COATED ORAL EVERY EVENING
COMMUNITY
Start: 2024-03-30

## 2024-05-21 RX ORDER — TERIPARATIDE 250 UG/ML
INJECTION, SOLUTION SUBCUTANEOUS
COMMUNITY
Start: 2023-07-02 | End: 2025-01-20

## 2024-05-21 ASSESSMENT — ENCOUNTER SYMPTOMS
VOMITING: 0
CHILLS: 0
NAUSEA: 0
DIARRHEA: 0
DYSURIA: 0
ABDOMINAL PAIN: 1
FEVER: 0
FLANK PAIN: 0

## 2024-05-21 NOTE — ASSESSMENT & PLAN NOTE
Neg CMT.  Having her start Monistat OTC as fluconazole interacts with her celexa.  Sending out urine culture and vaginitis panel to [facility].  If negative workup, recommend U/S.  She's not seeing GYN for another month.

## 2024-05-21 NOTE — PROGRESS NOTES
[facility]  [address] [place] [address]  [phone]          Kolby Alonso is a 64 y.o. female who presents with ABD PAIN  Chief Complaint   Patient presents with    Abdominal Pain     Left sided. Started Friday.    UTI     Has some vaginal irritation. Denies burning and urgency.           4 days ago she developed mild LLQ / suprapubic discomfort, foul smelling urine, frequency, and external genital irritation.  Suspected UTI.  Has been drinking more water and taking AZO / cranberry pills without relief.  Home UTI test +leuks.  Last BM was today with normal color / consistency.  She is due for colonoscopy / egd.  Followed by Dr. Ellsworth.  Goes q5yr.  Her yearly GYN appt is next month.  No longer sexually active w/  2/2 his parkinsons.        Medical History:  Past Medical History:   Diagnosis Date    Anxiety     GERD (gastroesophageal reflux disease)     Hypertension     Lipid disorder        Surgical History:  Past Surgical History:   Procedure Laterality Date    CHOLECYSTECTOMY      OOPHORECTOMY      TUBAL LIGATION         Social History:  Social History     Social History Narrative    Not on file       Family History:  Family History   Problem Relation Age of Onset    Heart disease Biological Father     Hyperlipidemia Biological Father        Allergies:  Amoxicillin, Azithromycin, Erythromycin base, Levofloxacin, Penicillins, Sulfanilamide, and Tetracycline    Current Medications:  Current Outpatient Medications   Medication Sig Dispense Refill    atorvastatin (LIPITOR) 20 mg tablet Take 20 mg by mouth every evening.      calcium citrate-vitamin D3 (CITRACAL) 200 mg-6.25 mcg (250 unit) tablet Take 600 tablets by mouth daily.      citalopram (celeXA) 40 mg tablet TAKE 1 TABLET BY MOUTH DAILY 90 tablet 0    ezetimibe (ZETIA) 10 mg tablet Take 10 mg by mouth every evening.      FA/mv,Ca,iron,min/lycopene/lut (MULTIVITAL ORAL) No SIG Entered      famotidine (PEPCID) 10 mg  "tablet Take 10 mg by mouth 2 (two) times a day. Patient states taking once daily      fexofenadine-pseudoephedrine (ALLEGRA-D 24 HOUR) 180-240 mg per 24 hr tablet take 1 tablet by oral route  every day on an empty stomach with a glass of water 90 tablet 1    fluticasone propionate (FLONASE) 50 mcg/actuation nasal spray inhale 1 spray (50MCG)  by intranasal route  every day in each nostril      lisinopriL (PRINIVIL) 20 mg tablet Take 20 mg by mouth daily.      miconazole (MICOTIN) 2 % cream Apply 1 Application topically 2 (two) times a day. 28.35 g 0    omega-3 fatty acids/fish oil (FISH OIL OMEGA 3-6-9 ORAL) No SIG Entered      teriparatide 20 mcg/dose (620mcg/2.48mL) pen injector       vitamin E, dl, acetate, 22.5 mg/mL drops Take by mouth daily.       No current facility-administered medications for this visit.       Review of Systems:  Review of Systems   Constitutional:  Negative for chills and fever.   Gastrointestinal:  Positive for abdominal pain. Negative for blood in stool, constipation, diarrhea, nausea and vomiting.   Genitourinary:  Positive for frequency. Negative for dysuria, flank pain, genital sores, hematuria, vaginal bleeding and vaginal discharge.   All other systems reviewed and are negative.      Objective     Vital Signs:  Vitals:    05/21/24 1431   BP: 132/88   BP Location: Left upper arm   Patient Position: Sitting   Pulse: 97   Resp: 16   Temp: 36.6 °C (97.9 °F)   SpO2: 97%   Weight: 68 kg (150 lb)   Height: 1.575 m (5' 2\")       BMI:  Body mass index is 27.44 kg/m².     Physical Exam  Vitals reviewed.   Constitutional:       Appearance: Normal appearance.   HENT:      Head: Normocephalic and atraumatic.   Eyes:      Extraocular Movements: Extraocular movements intact.   Cardiovascular:      Rate and Rhythm: Normal rate and regular rhythm.   Pulmonary:      Effort: Pulmonary effort is normal.      Breath sounds: Normal breath sounds.   Abdominal:      General: Abdomen is flat.      " Palpations: Abdomen is soft.      Tenderness: There is no abdominal tenderness. There is no right CVA tenderness or left CVA tenderness.   Genitourinary:     Exam position: Lithotomy position.      Pubic Area: No rash.       Labia:         Right: Rash present. No tenderness.         Left: Rash (trace erythema @ b/l labia minora) present. No tenderness.       Urethra: No urethral pain or urethral swelling.      Vagina: Vaginal discharge (scant white dc) present. No tenderness or bleeding.      Cervix: Normal. No cervical motion tenderness.      Uterus: Normal.       Adnexa: Right adnexa normal.      Rectum: Normal.   Skin:     General: Skin is warm and dry.   Neurological:      Mental Status: She is alert and oriented to person, place, and time.   Psychiatric:         Behavior: Behavior normal.         Lab Results   Component Value Date    WBC 4.8 12/12/2023    HGB 12.8 12/12/2023    HCT 39.0 12/12/2023     12/12/2023    CHOL 162 08/08/2023    TRIG 220 (H) 08/08/2023    HDL 44 (L) 08/08/2023    ALT 22 12/12/2023    AST 20 12/12/2023     12/12/2023    K 5.3 12/12/2023     12/12/2023    CREATININE 0.77 12/12/2023    BUN 18 12/12/2023    CO2 30 12/12/2023    TSH 2.56 06/21/2023        Procedures   Assessment     There are no Patient Instructions on file for this visit.  Problem List Items Addressed This Visit       Vaginal irritation - Primary     Neg CMT.  Having her start Monistat OTC as fluconazole interacts with her celexa.  Sending out urine culture and vaginitis panel to [facility].  If negative workup, recommend U/S.  She's not seeing GYN for another month.         Relevant Orders    Bacterial Vaginosis Panel    Suprapubic pain    Relevant Orders    POCT urinalysis dipstick (Completed)    Urine culture               AURORA Joseph  5/22/2024

## 2024-05-22 ASSESSMENT — ENCOUNTER SYMPTOMS
FREQUENCY: 1
BLOOD IN STOOL: 0
HEMATURIA: 0
CONSTIPATION: 0

## 2024-05-23 LAB
BACTERIA UR CULT: NORMAL
CANDIDA RRNA VAG QL PROBE: NOT DETECTED
G VAGINALIS RRNA GENITAL QL PROBE: NOT DETECTED
T VAGINALIS RRNA GENITAL QL PROBE: NOT DETECTED

## 2024-05-31 ENCOUNTER — TELEPHONE (OUTPATIENT)
Dept: RHEUMATOLOGY | Facility: CLINIC | Age: 64
End: 2024-05-31
Payer: MEDICARE

## 2024-05-31 DIAGNOSIS — M81.0 AGE-RELATED OSTEOPOROSIS WITHOUT CURRENT PATHOLOGICAL FRACTURE: Primary | ICD-10-CM

## 2024-06-01 LAB
ALBUMIN SERPL-MCNC: 4.5 G/DL (ref 3.6–5.1)
ALBUMIN/GLOB SERPL: 1.9 (CALC) (ref 1–2.5)
ALP SERPL-CCNC: 149 U/L (ref 37–153)
ALT SERPL-CCNC: 24 U/L (ref 6–29)
AST SERPL-CCNC: 22 U/L (ref 10–35)
BILIRUB SERPL-MCNC: 0.4 MG/DL (ref 0.2–1.2)
BUN SERPL-MCNC: 20 MG/DL (ref 7–25)
BUN/CREAT SERPL: NORMAL (CALC) (ref 6–22)
CALCIUM SERPL-MCNC: 10 MG/DL (ref 8.6–10.4)
CHLORIDE SERPL-SCNC: 103 MMOL/L (ref 98–110)
CO2 SERPL-SCNC: 28 MMOL/L (ref 20–32)
CREAT SERPL-MCNC: 0.75 MG/DL (ref 0.5–1.05)
EGFRCR SERPLBLD CKD-EPI 2021: 88 ML/MIN/1.73M2
GLOBULIN SER CALC-MCNC: 2.4 G/DL (CALC) (ref 1.9–3.7)
GLUCOSE SERPL-MCNC: 82 MG/DL (ref 65–139)
POTASSIUM SERPL-SCNC: 5 MMOL/L (ref 3.5–5.3)
PROT SERPL-MCNC: 6.9 G/DL (ref 6.1–8.1)
SODIUM SERPL-SCNC: 138 MMOL/L (ref 135–146)

## 2024-06-03 ENCOUNTER — OFFICE VISIT (OUTPATIENT)
Dept: RHEUMATOLOGY | Facility: CLINIC | Age: 64
End: 2024-06-03
Payer: MEDICARE

## 2024-06-03 VITALS
OXYGEN SATURATION: 98 % | WEIGHT: 148.6 LBS | HEART RATE: 81 BPM | DIASTOLIC BLOOD PRESSURE: 76 MMHG | SYSTOLIC BLOOD PRESSURE: 122 MMHG | HEIGHT: 62 IN | BODY MASS INDEX: 27.34 KG/M2 | TEMPERATURE: 98.1 F

## 2024-06-03 DIAGNOSIS — M81.0 AGE-RELATED OSTEOPOROSIS WITHOUT CURRENT PATHOLOGICAL FRACTURE: Primary | ICD-10-CM

## 2024-06-03 DIAGNOSIS — Z79.899 ENCOUNTER FOR MONITORING TERIPARATIDE THERAPY: ICD-10-CM

## 2024-06-03 DIAGNOSIS — Z51.81 ENCOUNTER FOR MONITORING TERIPARATIDE THERAPY: ICD-10-CM

## 2024-06-03 PROCEDURE — G8754 DIAS BP LESS 90: HCPCS | Performed by: INTERNAL MEDICINE

## 2024-06-03 PROCEDURE — G8752 SYS BP LESS 140: HCPCS | Performed by: INTERNAL MEDICINE

## 2024-06-03 PROCEDURE — 99214 OFFICE O/P EST MOD 30 MIN: CPT | Performed by: INTERNAL MEDICINE

## 2024-06-03 ASSESSMENT — ENCOUNTER SYMPTOMS
DYSURIA: 0
WEAKNESS: 0
AGITATION: 0
MYALGIAS: 0
DIARRHEA: 0
HEADACHES: 0
UNEXPECTED WEIGHT CHANGE: 0
BRUISES/BLEEDS EASILY: 0
FATIGUE: 1
ABDOMINAL PAIN: 0
COUGH: 0
SHORTNESS OF BREATH: 0
HEMATURIA: 0
NECK PAIN: 0
ARTHRALGIAS: 1
NUMBNESS: 0
BLOOD IN STOOL: 0
JOINT SWELLING: 0
FEVER: 0
BACK PAIN: 0
FREQUENCY: 0
CONFUSION: 0
NERVOUS/ANXIOUS: 0

## 2024-06-03 NOTE — PROGRESS NOTES
Rheumatology                       Initial visit                Reason for visit:   Chief Complaint   Patient presents with    Osteoporosis     Age-related osteoporosis without current pathological fracture       HPI     Kolby Alonso is a 64 y.o. female here for follow up of Osteoporosis      Interval history:  ------------------------  Patient is accompanied by her   Reviewed labs from 5/31/24 (WNL)  She has been on Forteo injections since July 2023.   Says this would be a year of Forteo   Patient would like to get a repeat DEXA scan to see how her improvement has been   She feels fine except occasional achiness in the ankles   Denies any other joint pains/swelling   on daily calcium and vitamin D    History:  -------------  Initially seen in clinic on 6/20/23  DEXA scan done on 5/12/23 which showed osteoporosis ( lumbar spine T score -3.3)  Patient has never been treated for osteoporosis before  Last year she tripped and fell and broke a small bone in right foot   Denies smoking history  Denies any previous use of glucocorticoids  Admits to physical exercise  Denies any history of esophageal disorders, bariatric surgery, chronic kidney disease, renal stones     family history of osteopenia in Mother         DEXA scan/BMD over years :      Date                           Lumbar spine                                 Left femur/hip                                     Right femur/hip    5/12/23                          -3.3                                                     -2.6                                                       -2.5                   LABS :  ------------  6/21/23 : Normal PTH/Vitamin D/Phosphate/TSH                          Past Medical History:   Diagnosis Date    Anxiety     GERD (gastroesophageal reflux disease)     Hypertension     Lipid disorder        Past Surgical History:   Procedure Laterality Date    CHOLECYSTECTOMY      OOPHORECTOMY      TUBAL LIGATION          Social History     Tobacco Use    Smoking status: Never    Smokeless tobacco: Never   Vaping Use    Vaping Use: Never used   Substance Use Topics    Alcohol use: Yes     Comment: socially    Drug use: Never       Family History   Problem Relation Age of Onset    Heart disease Biological Father     Hyperlipidemia Biological Father        Allergies:  Amoxicillin, Azithromycin, Erythromycin base, Levofloxacin, Penicillins, Sulfanilamide, and Tetracycline    Current Outpatient Medications   Medication Sig Dispense Refill    atorvastatin (LIPITOR) 20 mg tablet Take 20 mg by mouth every evening.      calcium citrate-vitamin D3 (CITRACAL) 200 mg-6.25 mcg (250 unit) tablet Take 600 tablets by mouth daily.      citalopram (celeXA) 40 mg tablet TAKE 1 TABLET BY MOUTH DAILY 90 tablet 0    ezetimibe (ZETIA) 10 mg tablet Take 10 mg by mouth every evening.      FA/mv,Ca,iron,min/lycopene/lut (MULTIVITAL ORAL) No SIG Entered      famotidine (PEPCID) 10 mg tablet Take 10 mg by mouth 2 (two) times a day. Patient states taking once daily      fexofenadine-pseudoephedrine (ALLEGRA-D 24 HOUR) 180-240 mg per 24 hr tablet take 1 tablet by oral route  every day on an empty stomach with a glass of water 90 tablet 1    fluticasone propionate (FLONASE) 50 mcg/actuation nasal spray inhale 1 spray (50MCG)  by intranasal route  every day in each nostril      lisinopriL (PRINIVIL) 20 mg tablet Take 20 mg by mouth daily.      miconazole (MICOTIN) 2 % cream Apply 1 Application topically 2 (two) times a day. 28.35 g 0    omega-3 fatty acids/fish oil (FISH OIL OMEGA 3-6-9 ORAL) No SIG Entered      teriparatide 20 mcg/dose (620mcg/2.48mL) pen injector       vitamin E, dl, acetate, 22.5 mg/mL drops Take by mouth daily.       No current facility-administered medications for this visit.       Review of Systems   Constitutional:  Positive for fatigue. Negative for fever and unexpected weight change.   HENT:  Negative for mouth sores and nosebleeds.     Eyes:  Negative for visual disturbance.   Respiratory:  Negative for cough and shortness of breath.    Cardiovascular:  Negative for chest pain and leg swelling.   Gastrointestinal:  Negative for abdominal pain, blood in stool and diarrhea.   Genitourinary:  Negative for dysuria, frequency and hematuria.   Musculoskeletal:  Positive for arthralgias. Negative for back pain, joint swelling, myalgias and neck pain.   Skin:  Negative for rash.   Neurological:  Negative for weakness, numbness and headaches.   Hematological:  Does not bruise/bleed easily.   Psychiatric/Behavioral:  Negative for agitation and confusion. The patient is not nervous/anxious.         Physical Exam  Constitutional:       Appearance: Normal appearance. She is normal weight.   HENT:      Mouth/Throat:      Pharynx: Oropharynx is clear.   Eyes:      Conjunctiva/sclera: Conjunctivae normal.   Cardiovascular:      Rate and Rhythm: Normal rate and regular rhythm.      Pulses: Normal pulses.      Heart sounds: Normal heart sounds.   Pulmonary:      Effort: Pulmonary effort is normal.      Breath sounds: Normal breath sounds. No wheezing.   Abdominal:      General: Bowel sounds are normal.      Palpations: Abdomen is soft.      Tenderness: There is no abdominal tenderness.   Musculoskeletal:         General: No swelling or tenderness. Normal range of motion.      Cervical back: Normal range of motion and neck supple.      Right lower leg: No edema.      Left lower leg: No edema.   Skin:     General: Skin is warm.      Findings: Bruising present. No rash.      Comments: Mild bruising on both sides of umbilicus without any underlying hematoma    Neurological:      General: No focal deficit present.      Mental Status: She is alert and oriented to person, place, and time.      Motor: No weakness.   Psychiatric:         Mood and Affect: Mood normal.         Behavior: Behavior normal.          Vitals:    06/03/24 1002   BP: 122/76   Pulse: 81   Temp:  "36.7 °C (98.1 °F)   SpO2: 98%       Wt Readings from Last 3 Encounters:   06/03/24 67.4 kg (148 lb 9.6 oz)   05/21/24 68 kg (150 lb)   11/30/23 67.7 kg (149 lb 3.2 oz)       Body mass index is 27.18 kg/m².    Auto-immune panel  No results found for: \"JASKARAN\", \"RF\", \"CCP\", \"DSDNAAB\", \"SMITHRNP\", \"SCL70\", \"CENTBAB\", \"JO1\", \"SSAAB\", \"SSBAB\", \"RNAPOLY\"     Hematology  Lab Results   Component Value Date    WBC 4.8 12/12/2023    HGB 12.8 12/12/2023    HCT 39.0 12/12/2023     12/12/2023       Chemistries  Lab Results   Component Value Date     05/31/2024    K 5.0 05/31/2024     05/31/2024    CREATININE 0.75 05/31/2024    BUN 20 05/31/2024    CO2 28 05/31/2024    GLUCOSE 82 05/31/2024    CALCIUM 10.0 05/31/2024    MG 2.2 11/07/2017    ALT 24 05/31/2024    AST 22 05/31/2024       Cholesterol  Lab Results   Component Value Date    CHOL 162 08/08/2023    TRIG 220 (H) 08/08/2023    HDL 44 (L) 08/08/2023    LDLCALC 86 08/08/2023       Endocrine  Lab Results   Component Value Date    TSH 2.56 06/21/2023         Assessment/Plan     Kolby Alonso is a 64 y.o. female with Past Medical History of hyperlipidemia, GERD, hypertension, anxiety   Is here for follow up  of osteoporosis    1. Age-related osteoporosis without current pathological fracture  Osteoporosis diagnosed in 2023  based on the DEXA scan done on 5/12/23  Worse BMD in lumbar spine with a T score of -3.3  No recent fractures  Never been on any medications for osteoporosis prior to this     Started Forteo/Teripartaide daily SQ injections in July 2023  Tolerating the medication well  She wants to repeat DEXA scan and see if there has been improvement   She wants to switch to Prolia as its been a year of Forteo  Ideally we recommend 18 months of Forteo  Patient would like to make a decision after seeing DEXA scan     Recommend taking daily calcium approximately 1200 mg (either from dietary intake or through supplements) and daily vitamin D of at least 1000 to " 2000 international units  Encourage weightbearing exercises for at least 30 minutes on most days of the week and incorporate muscle-strengthening and posture exercises two to three days a week  - DEXA BONE DENSITY; Future    2. Encounter for monitoring teriparatide therapy  Labs done recently CLAUDIA Velez MD  6/3/2024

## 2024-06-07 SDOH — ECONOMIC STABILITY: FOOD INSECURITY: WITHIN THE PAST 12 MONTHS, YOU WORRIED THAT YOUR FOOD WOULD RUN OUT BEFORE YOU GOT MONEY TO BUY MORE.: PATIENT DECLINED

## 2024-06-07 SDOH — ECONOMIC STABILITY: INCOME INSECURITY: IN THE LAST 12 MONTHS, WAS THERE A TIME WHEN YOU WERE NOT ABLE TO PAY THE MORTGAGE OR RENT ON TIME?: PATIENT DECLINED

## 2024-06-07 SDOH — ECONOMIC STABILITY: FOOD INSECURITY: WITHIN THE PAST 12 MONTHS, THE FOOD YOU BOUGHT JUST DIDN'T LAST AND YOU DIDN'T HAVE MONEY TO GET MORE.: PATIENT DECLINED

## 2024-06-07 SDOH — ECONOMIC STABILITY: TRANSPORTATION INSECURITY
IN THE PAST 12 MONTHS, HAS LACK OF TRANSPORTATION KEPT YOU FROM MEETINGS, WORK, OR FROM GETTING THINGS NEEDED FOR DAILY LIVING?: PATIENT DECLINED

## 2024-06-07 SDOH — ECONOMIC STABILITY: TRANSPORTATION INSECURITY
IN THE PAST 12 MONTHS, HAS THE LACK OF TRANSPORTATION KEPT YOU FROM MEDICAL APPOINTMENTS OR FROM GETTING MEDICATIONS?: PATIENT DECLINED

## 2024-06-07 ASSESSMENT — SOCIAL DETERMINANTS OF HEALTH (SDOH)
IN THE PAST 12 MONTHS, HAS THE ELECTRIC, GAS, OIL, OR WATER COMPANY THREATENED TO SHUT OFF SERVICE IN YOUR HOME?: PATIENT DECLINED

## 2024-06-10 ENCOUNTER — OFFICE VISIT (OUTPATIENT)
Dept: FAMILY MEDICINE | Facility: CLINIC | Age: 64
End: 2024-06-10
Payer: MEDICARE

## 2024-06-10 VITALS
BODY MASS INDEX: 26.87 KG/M2 | SYSTOLIC BLOOD PRESSURE: 122 MMHG | OXYGEN SATURATION: 97 % | HEART RATE: 81 BPM | DIASTOLIC BLOOD PRESSURE: 80 MMHG | HEIGHT: 62 IN | TEMPERATURE: 97.5 F | RESPIRATION RATE: 16 BRPM | WEIGHT: 146 LBS

## 2024-06-10 DIAGNOSIS — E78.2 MIXED HYPERLIPIDEMIA: ICD-10-CM

## 2024-06-10 DIAGNOSIS — M81.0 AGE-RELATED OSTEOPOROSIS WITHOUT CURRENT PATHOLOGICAL FRACTURE: ICD-10-CM

## 2024-06-10 DIAGNOSIS — F32.A ANXIETY AND DEPRESSION: ICD-10-CM

## 2024-06-10 DIAGNOSIS — F41.9 ANXIETY AND DEPRESSION: ICD-10-CM

## 2024-06-10 DIAGNOSIS — I10 ESSENTIAL HYPERTENSION: ICD-10-CM

## 2024-06-10 DIAGNOSIS — Z00.00 MEDICARE ANNUAL WELLNESS VISIT, SUBSEQUENT: Primary | ICD-10-CM

## 2024-06-10 DIAGNOSIS — K21.9 GASTROESOPHAGEAL REFLUX DISEASE WITHOUT ESOPHAGITIS: ICD-10-CM

## 2024-06-10 PROCEDURE — G0439 PPPS, SUBSEQ VISIT: HCPCS | Performed by: STUDENT IN AN ORGANIZED HEALTH CARE EDUCATION/TRAINING PROGRAM

## 2024-06-10 ASSESSMENT — PAIN SCALES - GENERAL: PAINLEVEL: 0-NO PAIN

## 2024-06-10 ASSESSMENT — MINI COG
COMPLETED: YES
TOTAL SCORE: 5

## 2024-06-10 ASSESSMENT — PATIENT HEALTH QUESTIONNAIRE - PHQ9: SUM OF ALL RESPONSES TO PHQ9 QUESTIONS 1 & 2: 0

## 2024-06-10 NOTE — PROGRESS NOTES
Subjective     Kolby Alonso is a 64 y.o. female who presents for a subsequent annual wellness visit.     Patient Care Team:  Carmen Haque DO as PCP - General (Family Medicine)  Pattie Mota MD as Referring Physician (Obstetrics and Gynecology)  Millie Ellsworth MD as Referring Physician (Gastroenterology)    # PMHx  Anxiety-taking Celexa 40 mg daily  HLD-taking atorvastatin 20 mg daily, fish oil, and ezetimibe 10 mg daily. Following with Dr. Oquendo.  HTN- taking lisinopril 20 mg daily  GERD- taking Famotidine only, following with GI  Osteoporosis- following with Dr. Velez    GYN is Dr. 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, no plans for penitentiary any time soon    Comprehensive Medical and Social History  Patient Active Problem List   Diagnosis    Chronic seasonal allergic rhinitis due to pollen    Anxiety and depression    Essential hypertension    Gastroesophageal reflux disease without esophagitis    Mixed hyperlipidemia    Family history of coronary artery disease in father    Medicare annual wellness visit, subsequent    Age-related osteoporosis without current pathological fracture    Chronic low back pain without sciatica    Other disorders of calcium metabolism    High risk medication use    Vaginal irritation    Suprapubic pain     Past Medical History:   Diagnosis Date    Anxiety     GERD (gastroesophageal reflux disease)     Hypertension     Lipid disorder      Past Surgical History:   Procedure Laterality Date    CHOLECYSTECTOMY      OOPHORECTOMY      TUBAL LIGATION       Allergies   Allergen Reactions    Amoxicillin GI intolerance     Azithromycin     Erythromycin Base     Levofloxacin     Penicillins     Sulfanilamide     Tetracycline      Current Outpatient Medications   Medication Sig Dispense Refill    atorvastatin (LIPITOR) 20 mg tablet Take 20 mg by mouth every evening.      calcium citrate-vitamin D3 (CITRACAL) 200 mg-6.25 mcg (250 unit) tablet Take 600 tablets by mouth daily.      citalopram (celeXA) 40 mg tablet TAKE 1 TABLET BY MOUTH DAILY 90 tablet 0    ezetimibe (ZETIA) 10 mg tablet Take 10 mg by mouth every evening.      FA/mv,Ca,iron,min/lycopene/lut (MULTIVITAL ORAL) No SIG Entered      famotidine (PEPCID) 10 mg tablet Take 10 mg by mouth 2 (two) times a day. Patient states taking once daily      fexofenadine-pseudoephedrine (ALLEGRA-D 24 HOUR) 180-240 mg per 24 hr tablet take 1 tablet by oral route  every day on an empty stomach with a glass of water 90 tablet 1    fluticasone propionate (FLONASE) 50 mcg/actuation nasal spray inhale 1 spray (50MCG)  by intranasal route  every day in each nostril      lisinopriL (PRINIVIL) 20 mg tablet Take 20 mg by mouth daily.      miconazole (MICOTIN) 2 % cream Apply 1 Application topically 2 (two) times a day. 28.35 g 0    omega-3 fatty acids/fish oil (FISH OIL OMEGA 3-6-9 ORAL) No SIG Entered      vitamin E, dl, acetate, 22.5 mg/mL drops Take by mouth daily.      teriparatide 20 mcg/dose (620mcg/2.48mL) pen injector        No current facility-administered medications for this visit.     Social History     Tobacco Use    Smoking status: Never    Smokeless tobacco: Never   Vaping Use    Vaping Use: Never used   Substance Use Topics    Alcohol use: Yes     Comment: socially    Drug use: Never     Family History   Problem Relation Age of Onset    Heart disease Biological Father     Hyperlipidemia Biological Father      Objective   Vitals  Vitals:    06/10/24 1255   BP: 122/80   BP Location: Right upper arm   Patient Position: Sitting   Pulse: 81   Resp: 16   Temp: 36.4 °C (97.5 °F)   TempSrc:  "Tympanic   SpO2: 97%   Weight: 66.2 kg (146 lb)   Height: 1.575 m (5' 2\")   PainSc: 0-No pain     Body mass index is 26.7 kg/m².    PE:  3 cm lipoma present on RUE    PHQ  Will the patient answer the depression questions?: Yes   Little interest or pleasure in doing things: Not at all   Feeling down, depressed, or hopeless: Not at all   Depression Risk: 0                                             Mini Cog  Completed: Yes  Score: 5  Result: Negative    Get Up and Go  Result: Pass    STEADI Falls Risk  One or more falls in the last year: No           Has trouble stepping up onto a curb: No   Advised to use a cane or walker to get around safely: No   Often has to rush to the toilet: No   Feels unsteady when walking: No   Has lost some feeling in feet: No   Often feels sad or depressed: No   Steadies self on furniture while walking at home: No   Takes medication that makes him/her feel lightheaded or more tired than usual: No   Worried about falling: No   Takes medicine to sleep or improve mood: No   Needs to push with hands when rising from a chair: No   Falls screen completed: Yes     Assessment/Plan   Diagnoses and all orders for this visit:    Medicare annual wellness visit, subsequent (Primary)  Assessment & Plan:  Medicare physical: reviewed routine health screening and prevention with patient for the next 5-10 years and they have been educated as to when things will be due.  Depression screen and fall screen have been performed.  Please see scanned and reviewed Patient Self assessment for further information.  No Cognitive impairment was detected.  View Patient Instructions for other identified risks and recommended interventions  Patient specific information printed on patient instructions, including Health Maintenance list with due date.        Essential hypertension  Assessment & Plan:  Blood pressure well-controlled on current regimen.  Continue Lexapro 20 mg daily      Gastroesophageal reflux disease " without esophagitis  Assessment & Plan:  Well-controlled on famotidine.  Following with GI      Mixed hyperlipidemia    Age-related osteoporosis without current pathological fracture  Assessment & Plan:  Following with Dr. Velez. Cont current regimen      Anxiety and depression  Assessment & Plan:  Continue Celexa 40 mg daily      See Patient Instructions (the written plan) which was given to the patient for PPPS and health risk factors with interventions.    Return in about 1 year (around 6/10/2025) for Medicare annual wellness.

## 2024-06-10 NOTE — PATIENT INSTRUCTIONS
Your Personalized Prevention Plan Services (PPPS)    Preventive Services Checklist (Assumes Average Risk Unless Otherwise Noted):    Health Maintenance Topics with due status: Overdue       Topic Date Due    Depression Screening Never done    RSV (60+ years old [shared decision making] or in pregnancy during 32 through 36 weeks) Never done    Zoster Vaccine 12/23/2020    DTaP, Tdap, and Td Vaccines 02/15/2022    Breast Cancer Screening 01/13/2024    COVID-19 Vaccine 03/02/2024    Falls Risk Screening 05/29/2024     Health Maintenance Topics with due status: Not Due       Topic Last Completion Date    Colorectal Cancer Screening 10/25/2019    DEXA Scan 05/12/2023    Medicare Annual Wellness Visit 06/10/2024     Health Maintenance Topics with due status: Completed       Topic Last Completion Date    Hepatitis C Screening 11/07/2017    Pneumococcal (65 years and older) 06/05/2023    Influenza Vaccine 11/02/2023     Health Maintenance Topics with due status: Aged Out       Topic Date Due    Meningococcal ACWY Aged Out    RSV <20 months Aged Out    HIB Vaccines Aged Out    Hepatitis B Vaccines Aged Out    IPV Vaccines Aged Out    HPV Vaccines Aged Out       You May Be Eligible for These Additional Preventive Services   (Assumes Average Risk Unless Otherwise Noted)  Diabetes Screening Any 1 risk factor: hypertension, dyslipidemia, obesity, high glucose; or Any 2 risk factors: >=66yo, overweight, family history diabetes (covered every 6 months)   Hepatitis C Screening Any 1 risk factor: 1) blood transfusion before 1992,   2) current or past injection drug use (annually for high risk; if born between 4775-9824, see above for status).   Vaccine: Hepatitis B As necessary if at-risk: hemophilia, ESRD, diabetes, living with individual infected with hep B, healthcare worker with frequent contact with blood/bodily fluids (series covered once)   Sexually Transmitted Diseases (STDs) As necessary chlamydia,  gonorrhea, syphilis, hepatitis B (covered annually)  HIV if any 1 risk factor present: 1) <14yo or >66yo and at increased risk or 2) 15-66yo and ask for it (covered annually)   Lung Cancer Screening Low dose chest CT if all three risk factors: 1) 50-76yo, 2) smoker or quit within last 15y, 3) >=20 pack years (covered annually).  No results found for this or any previous visit.       Cholesterol Screening Both risk factors: 1) >=19yo and 2)  increased risk coronary artery disease (covered every 5 years).   Breast Cancer Screening Covered once 35-38yo, annually >=39yo (if >=51yo, see above for status).       Health Risk Factors with Personalized Education:  ----------------------------------------------------------------------------------------------------------------------  Stress management:  Understanding Your Stress  Think about how you know when you’re stressed.  Think about how your thoughts or behaviors are different when you’re stressed.  Think about what triggers stress for you.  Think about how you handle stress, and whether you’re making unhealthy choices in response to stress (like smoking, drinking alcohol or overeating).  Managing Stress  Take a break from the stressful situation.  Reduce your stress levels by exercising, talking with family/friends, ensuring adequate sleep.  Consider meditation or yoga.  Make sure you plan time to do things you enjoy.  Let your PCP know if you’re having problems limiting your stress.  ----------------------------------------------------------------------------------------------------------------------  Controlling Your Blood Pressure  Maintain a normal weight (body mass index between 18.5 and 24.9).  Eat more fruit, vegetables and low-fat dairy.  Eat less saturated fat and total fat.  Lower your sodium (salt) intake.  Try to stay under 1500 mg per day, but if you cannot get your intake to be that low, at least lower it by 1000 mg.  Stay active.  Try to get at least 90  to 150 minutes of exercise per week.  Try brisk walking, swimming, bicycling or dancing.  Limit alcohol intake.  When you do consume alcohol, drink no more than 1 drink per day.  If you have been prescribed medication, take it regularly and exactly as prescribed.  Let your PCP know if you have any problems or questions about your medication.  Check your blood pressure at home or at the store.  Write down your readings and share them with your PCP  ----------------------------------------------------------------------------------------------------------------------  Controlling Your Cholesterol  Reduce the amount of saturated and trans fat in your diet.  Limit intake of red meat.  Consume only low-fat or non-fat/skim dairy.  Limit fried food.  Cook with vegetable oils.  Reduce your intake of sugary foods, sugary drinks and alcohol.  Eat a diet high in fruit, vegetables and whole grains.  Get protein from fish, poultry and a small portion of nuts.  Stay active.  Try to get at least 90 to 150 minutes of exercise per week.  Try brisk walking, swimming, bicycling or dancing.  Maintain a healthy weight by balancing your diet and exercise.  If you have been prescribed medication, take it regularly and exactly as prescribed. Let your PCP know if you have any problems or questions about your medication.  It’s important to know your cholesterol numbers.  When recommended by your PCP, get the cholesterol blood test.  ---------------------------------------------------------------------------------------------------------------------   Controlling Your Osteoporosis, Strengthening Your Bones  Try to get at least 90 to 150 minutes of weight-bearing exercise per week.  Ensure intake of at least 1200mg of calcium per day.  Eat foods high in calcium like milk and other dairy, green vegetables, fruit, canned fish with soft and edible bones, nuts, calcium-set tofu.  Some foods are calcium-fortified, like bread, cereal, fruit juices  and mineral water.  Help your body make vitamin D by getting 10-15 minutes per day of sunlight.    Ensure intake of at least 600IU of vitamin D per day.  Eat foods high in vitamin D like oily fish (salmon, sardines, mackerel) and eggs.  Some foods are fortified with vitamin D, like dairy and cereals.  Avoid high amounts of caffeine and salt, since they can cause the body to loose calcium.  Limit alcohol intake, since it is associated with weaker bones and is associated with falls and fractures.  Limit intake of fizzy drinks.  If you have been prescribed medication, take it regularly and exactly as prescribed.  Let your PCP know if you have any problems or questions about your medication  ----------------------------------------------------------------------------------------------------------------------  Reducing Your Risk of Falls  Tell your PCP if any of your medications make you feel tired, dizzy, lightheaded or off-balance.  Maintain coordination, flexibility and balance by ensuring regular physical activity.  Limit alcohol intake to 1 drink per day.  Consider avoiding all alcohol intake.  Ensure good vision.  Visit an ophthalmologist or optometrist regularly for vision screening or to make sure your glasses / contact lens prescription is correct.  If you need glasses or contacts, wear them.  When you get new glasses or contacts, take time to get used to them.  Do not wear sunglasses or tinted lenses when indoors.  Ensure good hearing.  Have your hearing checked if you are having trouble hearing, or family and friends think you cannot hear them.  If you need a hearing aid, be sure it fits well and wear it.  Get enough rest.  Ensure about 7-9 hours of sleep every day.  Get up slowly from your bed or chairs.  Do not start walking until you are sure you feel steady.  Wear non-skid, rubber-soled, low-heeled shoes.  Do not walk in socks, or in shoes and slippers with smooth soles.  If your PCP or therapist  recommends using a cane or walker, use it regularly.  Make your home safer.  Increase lighting throughout the house, especially at the top and bottom of stairs.  Ensure lighting is easily turned on when getting up in the middle of the night.  Make sure there are two secure rails on all stairs.  Install grab bars in the bathtub / shower and near the toilet.  Consider using a shower chair and / or a hand-held shower.  Spread sand or salt on icy surfaces.  Beware of wet surfaces, which can be icy.  Tell your PCP if you have fallen.

## 2024-06-10 NOTE — ASSESSMENT & PLAN NOTE
Dx:  Neck pain on left side (M54.2)          Insurance (Authorized # of Visits):   Lili Skinner MA O           Authorizing Physician: Dr. Virginia Garcia MD visit: none scheduled  Fall Risk: standard           Precautions:  Cervical Vertebral Fusion    Subjecti Following with Dr. Velez. Cont current regimen   demonstrate improved FOTO self functional assessment score from initial 59/100, to 65/100 or greater to allow for maximization of functional status      Plan: Continued to PT for cervical ROM, both shoulder and scapular strengthening and stabilization exer

## 2024-06-10 NOTE — ASSESSMENT & PLAN NOTE
Medicare physical: reviewed routine health screening and prevention with patient for the next 5-10 years and they have been educated as to when things will be due.  Depression screen and fall screen have been performed.  Please see scanned and reviewed Patient Self assessment for further information.  No Cognitive impairment was detected.  View Patient Instructions for other identified risks and recommended interventions  Patient specific information printed on patient instructions, including Health Maintenance list with due date.

## 2024-07-02 RX ORDER — CITALOPRAM 40 MG/1
40 TABLET, FILM COATED ORAL DAILY
Qty: 90 TABLET | Refills: 0 | Status: SHIPPED | OUTPATIENT
Start: 2024-07-02 | End: 2024-09-19

## 2024-09-16 ENCOUNTER — OFFICE VISIT (OUTPATIENT)
Dept: RHEUMATOLOGY | Facility: CLINIC | Age: 64
End: 2024-09-16
Payer: MEDICARE

## 2024-09-16 VITALS
HEART RATE: 82 BPM | WEIGHT: 146.4 LBS | SYSTOLIC BLOOD PRESSURE: 108 MMHG | HEIGHT: 62 IN | DIASTOLIC BLOOD PRESSURE: 70 MMHG | BODY MASS INDEX: 26.94 KG/M2 | OXYGEN SATURATION: 98 %

## 2024-09-16 DIAGNOSIS — Z79.899 ENCOUNTER FOR MONITORING TERIPARATIDE THERAPY: ICD-10-CM

## 2024-09-16 DIAGNOSIS — M81.0 AGE-RELATED OSTEOPOROSIS WITHOUT CURRENT PATHOLOGICAL FRACTURE: Primary | ICD-10-CM

## 2024-09-16 DIAGNOSIS — Z51.81 ENCOUNTER FOR MONITORING TERIPARATIDE THERAPY: ICD-10-CM

## 2024-09-16 DIAGNOSIS — Z79.899 HIGH RISK MEDICATION USE: ICD-10-CM

## 2024-09-16 PROCEDURE — 99213 OFFICE O/P EST LOW 20 MIN: CPT | Performed by: INTERNAL MEDICINE

## 2024-09-16 PROCEDURE — G8754 DIAS BP LESS 90: HCPCS | Performed by: INTERNAL MEDICINE

## 2024-09-16 PROCEDURE — G8752 SYS BP LESS 140: HCPCS | Performed by: INTERNAL MEDICINE

## 2024-09-16 RX ORDER — PANTOPRAZOLE SODIUM 40 MG/1
40 TABLET, DELAYED RELEASE ORAL DAILY
COMMUNITY
Start: 2024-07-01

## 2024-09-16 ASSESSMENT — ENCOUNTER SYMPTOMS
BLOOD IN STOOL: 0
MYALGIAS: 0
NUMBNESS: 0
COUGH: 0
HEMATURIA: 0
ARTHRALGIAS: 1
JOINT SWELLING: 0
ABDOMINAL PAIN: 0
NERVOUS/ANXIOUS: 0
CONFUSION: 0
FREQUENCY: 0
BRUISES/BLEEDS EASILY: 0
AGITATION: 0
SHORTNESS OF BREATH: 0
UNEXPECTED WEIGHT CHANGE: 0
DYSURIA: 0
HEADACHES: 0
NECK PAIN: 0
DIARRHEA: 0
BACK PAIN: 0
FATIGUE: 1
FEVER: 0
WEAKNESS: 0

## 2024-09-16 NOTE — PROGRESS NOTES
Rheumatology                       Initial visit                Reason for visit:   Chief Complaint   Patient presents with    Osteoporosis     Age-related osteoporosis without current pathological fracture       HPI     Kolby Alonso is a 64 y.o. female here for follow up of Osteoporosis      Interval history:  ------------------------  Patient is accompanied by her   She has been on Forteo injections since July 2023.  She will wait till she is done with the Forteo to get the DEXA scan   R ankle always cracks and aches   She went to urgent care in [place] who did X rays which was fine   She went to Podiatrist at [facility] who did imaging and everything looked fine  Was told she has tendinitis. Using Voltaren gel   Says currently the pain resolved   Denies any other joint pains/swelling   on daily calcium and vitamin D    Started Pantoprazole by GI after she had Endoscopy/Colonoscopy      History:  -------------  Initially seen at [facility] on 6/20/23 for Osteoporosis   DEXA scan done  5/12/23  showed osteoporosis ( lumbar spine T score -3.3)  Patient has never been treated for osteoporosis before  Last year she tripped and fell and broke a small bone in right foot   Denies any history of esophageal disorders, bariatric surgery, chronic kidney disease, renal stones  FH of osteopenia in Mother     Started Forteo in July 2023    DEXA scan/BMD over years :      Date                           Lumbar spine                                 Left femur/hip                                     Right femur/hip    5/12/23                          -3.3                                                     -2.6                                                       -2.5                   LABS :  ------------  6/21/23 : Normal PTH/Vitamin D/Phosphate/TSH                          Past Medical History:   Diagnosis Date    Anxiety     GERD (gastroesophageal reflux disease)     Hypertension     Lipid  disorder        Past Surgical History   Procedure Laterality Date    Cholecystectomy      Oophorectomy      Tubal ligation         Social History     Tobacco Use    Smoking status: Never    Smokeless tobacco: Never   Vaping Use    Vaping status: Never Used   Substance Use Topics    Alcohol use: Yes     Comment: socially    Drug use: Never       Family History   Problem Relation Name Age of Onset    Heart disease Biological Father      Hyperlipidemia Biological Father         Allergies:  Amoxicillin, Azithromycin, Erythromycin base, Levofloxacin, Penicillins, Sulfanilamide, and Tetracycline    Current Outpatient Medications   Medication Sig Dispense Refill    atorvastatin (LIPITOR) 20 mg tablet Take 20 mg by mouth every evening.      calcium citrate-vitamin D3 (CITRACAL) 200 mg-6.25 mcg (250 unit) tablet Take 600 tablets by mouth daily.      citalopram (celeXA) 40 mg tablet TAKE 1 TABLET BY MOUTH DAILY 90 tablet 0    ezetimibe (ZETIA) 10 mg tablet Take 10 mg by mouth every evening.      FA/mv,Ca,iron,min/lycopene/lut (MULTIVITAL ORAL) No SIG Entered      famotidine (PEPCID) 10 mg tablet Take 10 mg by mouth 2 (two) times a day. Patient states taking once daily      fexofenadine-pseudoephedrine (ALLEGRA-D 24 HOUR) 180-240 mg per 24 hr tablet take 1 tablet by oral route  every day on an empty stomach with a glass of water 90 tablet 1    fluticasone propionate (FLONASE) 50 mcg/actuation nasal spray inhale 1 spray (50MCG)  by intranasal route  every day in each nostril      lisinopriL (PRINIVIL) 20 mg tablet Take 20 mg by mouth daily.      omega-3 fatty acids/fish oil (FISH OIL OMEGA 3-6-9 ORAL) No SIG Entered      teriparatide 20 mcg/dose (620mcg/2.48mL) pen injector       vitamin E, dl, acetate, 22.5 mg/mL drops Take by mouth daily.      miconazole (MICOTIN) 2 % cream Apply 1 Application topically 2 (two) times a day. 28.35 g 0     No current facility-administered medications for this visit.       Review of Systems    Constitutional:  Positive for fatigue. Negative for fever and unexpected weight change.   HENT:  Negative for mouth sores and nosebleeds.    Eyes:  Negative for visual disturbance.   Respiratory:  Negative for cough and shortness of breath.    Cardiovascular:  Negative for chest pain and leg swelling.   Gastrointestinal:  Negative for abdominal pain, blood in stool and diarrhea.   Genitourinary:  Negative for dysuria, frequency and hematuria.   Musculoskeletal:  Positive for arthralgias. Negative for back pain, joint swelling, myalgias and neck pain.   Skin:  Negative for rash.   Neurological:  Negative for weakness, numbness and headaches.   Hematological:  Does not bruise/bleed easily.   Psychiatric/Behavioral:  Negative for agitation and confusion. The patient is not nervous/anxious.         Physical Exam  Constitutional:       Appearance: Normal appearance. She is normal weight.   HENT:      Mouth/Throat:      Pharynx: Oropharynx is clear.   Eyes:      Conjunctiva/sclera: Conjunctivae normal.   Cardiovascular:      Rate and Rhythm: Normal rate and regular rhythm.      Pulses: Normal pulses.      Heart sounds: Normal heart sounds.   Pulmonary:      Effort: Pulmonary effort is normal.      Breath sounds: Normal breath sounds. No wheezing.   Abdominal:      General: Bowel sounds are normal.      Palpations: Abdomen is soft.      Tenderness: There is no abdominal tenderness.   Musculoskeletal:         General: No swelling or tenderness. Normal range of motion.      Cervical back: Normal range of motion and neck supple.      Right lower leg: No edema.      Left lower leg: No edema.   Skin:     General: Skin is warm.      Findings: Bruising present. No rash.      Comments: Mild bruising on both sides of umbilicus without any underlying hematoma    Neurological:      General: No focal deficit present.      Mental Status: She is alert and oriented to person, place, and time.      Motor: No weakness.   Psychiatric:     "     Mood and Affect: Mood normal.         Behavior: Behavior normal.          There were no vitals filed for this visit.      Wt Readings from Last 3 Encounters:   09/16/24 66.4 kg (146 lb 6.4 oz)   06/10/24 66.2 kg (146 lb)   06/03/24 67.4 kg (148 lb 9.6 oz)       Body mass index is 26.78 kg/m².    Auto-immune panel  No results found for: \"JASKARAN\", \"RF\", \"CCP\", \"DSDNAAB\", \"SMITHRNP\", \"SCL70\", \"CENTBAB\", \"JO1\", \"SSAAB\", \"SSBAB\", \"RNAPOLY\"     Hematology  Lab Results   Component Value Date    WBC 4.8 12/12/2023    HGB 12.8 12/12/2023    HCT 39.0 12/12/2023     12/12/2023       Chemistries  Lab Results   Component Value Date     05/31/2024    K 5.0 05/31/2024     05/31/2024    CREATININE 0.75 05/31/2024    BUN 20 05/31/2024    CO2 28 05/31/2024    GLUCOSE 82 05/31/2024    CALCIUM 10.0 05/31/2024    MG 2.2 11/07/2017    ALT 24 05/31/2024    AST 22 05/31/2024       Cholesterol  Lab Results   Component Value Date    CHOL 162 08/08/2023    TRIG 220 (H) 08/08/2023    HDL 44 (L) 08/08/2023    LDLCALC 86 08/08/2023       Endocrine  Lab Results   Component Value Date    TSH 2.56 06/21/2023         Assessment/Plan     Kolby Alonso is a 64 y.o. female with Past Medical History of hyperlipidemia, GERD, hypertension, anxiety   Is here for follow up  of osteoporosis    1. Age-related osteoporosis without current pathological fracture (Primary)  Osteoporosis diagnosed in 2023  based on the DEXA scan done on 5/12/23  Worse BMD in lumbar spine with a T score of -3.3  No recent fractures but has a h/o Fracture in past   Never been on any medications for osteoporosis prior to this     Started Forteo/Teripartaide daily SQ injections in July 2023  Tolerating the medication well  She will complete the Forteo Rx in December   She will call us around the time so I can order DEXA scan, based on that we can discuss the further Rx options     Recommend taking daily calcium approximately 1200 mg (either from dietary intake or " through supplements) and daily vitamin D of at least 1000 to 2000 international units  Encourage weightbearing exercises for at least 30 minutes on most days of the week and incorporate muscle-strengthening and posture exercises two to three days a week    2. Encounter for monitoring teriparatide therapy  High risk medication needing monitoring     3. High risk medication use  Refer to problem list #3                 Jodie Velez MD  9/16/2024

## 2024-09-19 RX ORDER — CITALOPRAM 40 MG/1
40 TABLET, FILM COATED ORAL DAILY
Qty: 90 TABLET | Refills: 2 | Status: SHIPPED | OUTPATIENT
Start: 2024-09-19

## 2024-09-24 LAB
ALBUMIN SERPL-MCNC: 4.3 G/DL (ref 3.6–5.1)
ALBUMIN/GLOB SERPL: 1.7 (CALC) (ref 1–2.5)
ALP SERPL-CCNC: 138 U/L (ref 37–153)
ALT SERPL-CCNC: 28 U/L (ref 6–29)
AST SERPL-CCNC: 24 U/L (ref 10–35)
BILIRUB SERPL-MCNC: 0.7 MG/DL (ref 0.2–1.2)
BUN SERPL-MCNC: 18 MG/DL (ref 7–25)
BUN/CREAT SERPL: ABNORMAL (CALC) (ref 6–22)
CALCIUM SERPL-MCNC: 10.6 MG/DL (ref 8.6–10.4)
CHLORIDE SERPL-SCNC: 103 MMOL/L (ref 98–110)
CHOLEST SERPL-MCNC: 157 MG/DL
CHOLEST/HDLC SERPL: 2.9 (CALC)
CK SERPL-CCNC: 80 U/L (ref 29–143)
CO2 SERPL-SCNC: 29 MMOL/L (ref 20–32)
CREAT SERPL-MCNC: 0.82 MG/DL (ref 0.5–1.05)
EGFRCR SERPLBLD CKD-EPI 2021: 79 ML/MIN/1.73M2
GLOBULIN SER CALC-MCNC: 2.5 G/DL (CALC) (ref 1.9–3.7)
GLUCOSE SERPL-MCNC: 98 MG/DL (ref 65–99)
HDLC SERPL-MCNC: 55 MG/DL
LDLC SERPL CALC-MCNC: 72 MG/DL (CALC)
LDLC/HDLC SERPL: 1.3 (CALC)
NONHDLC SERPL-MCNC: 102 MG/DL (CALC)
POTASSIUM SERPL-SCNC: 4.7 MMOL/L (ref 3.5–5.3)
PROT SERPL-MCNC: 6.8 G/DL (ref 6.1–8.1)
SODIUM SERPL-SCNC: 139 MMOL/L (ref 135–146)
TRIGL SERPL-MCNC: 206 MG/DL

## 2025-01-20 ENCOUNTER — OFFICE VISIT (OUTPATIENT)
Dept: RHEUMATOLOGY | Facility: CLINIC | Age: 65
End: 2025-01-20
Payer: MEDICARE

## 2025-01-20 VITALS
HEART RATE: 74 BPM | WEIGHT: 152.2 LBS | RESPIRATION RATE: 18 BRPM | BODY MASS INDEX: 28.01 KG/M2 | TEMPERATURE: 97.4 F | DIASTOLIC BLOOD PRESSURE: 78 MMHG | OXYGEN SATURATION: 98 % | HEIGHT: 62 IN | SYSTOLIC BLOOD PRESSURE: 124 MMHG

## 2025-01-20 DIAGNOSIS — M81.0 AGE-RELATED OSTEOPOROSIS WITHOUT CURRENT PATHOLOGICAL FRACTURE: Primary | ICD-10-CM

## 2025-01-20 DIAGNOSIS — G89.29 CHRONIC LOW BACK PAIN WITHOUT SCIATICA, UNSPECIFIED BACK PAIN LATERALITY: ICD-10-CM

## 2025-01-20 DIAGNOSIS — Z79.899 HIGH RISK MEDICATION USE: ICD-10-CM

## 2025-01-20 DIAGNOSIS — M54.50 CHRONIC LOW BACK PAIN WITHOUT SCIATICA, UNSPECIFIED BACK PAIN LATERALITY: ICD-10-CM

## 2025-01-20 PROCEDURE — G8754 DIAS BP LESS 90: HCPCS | Performed by: INTERNAL MEDICINE

## 2025-01-20 PROCEDURE — 99214 OFFICE O/P EST MOD 30 MIN: CPT | Performed by: INTERNAL MEDICINE

## 2025-01-20 PROCEDURE — G8752 SYS BP LESS 140: HCPCS | Performed by: INTERNAL MEDICINE

## 2025-01-20 ASSESSMENT — ENCOUNTER SYMPTOMS
DIARRHEA: 0
BACK PAIN: 0
HEADACHES: 0
BLOOD IN STOOL: 0
UNEXPECTED WEIGHT CHANGE: 0
FREQUENCY: 0
MYALGIAS: 0
CONFUSION: 0
FATIGUE: 1
DYSURIA: 0
HEMATURIA: 0
ABDOMINAL PAIN: 0
SHORTNESS OF BREATH: 0
ARTHRALGIAS: 1
JOINT SWELLING: 0
COUGH: 0
NECK PAIN: 0
NUMBNESS: 0
BRUISES/BLEEDS EASILY: 0
AGITATION: 0
NERVOUS/ANXIOUS: 0
WEAKNESS: 0
FEVER: 0

## 2025-01-20 NOTE — PROGRESS NOTES
Rheumatology                       Initial visit                Reason for visit:   Chief Complaint   Patient presents with    Follow-up       HPI     Kolby Alonso is a 64 y.o. female here for follow up of Osteoporosis      Interval history:  ------------------------  Patient is accompanied by her   Last Forteo was on 11/22/24  She has been on Forteo injections since July 2023.  She will get her next DEXA scan in May 2025  Denies any recent falls or fractures   Has been having joint issues in upper and lower back   Not as bad   on daily calcium and vitamin D  Goes to dentist at end of January       History:  -------------  Initially seen at [facility] Rhe[facility] clinic on 6/20/23 for Osteoporosis   DEXA scan done  5/12/23  showed osteoporosis ( lumbar spine T score -3.3)  Patient has never been treated for osteoporosis before  Last year she tripped and fell and broke a small bone in right foot   Denies any history of esophageal disorders, bariatric surgery, chronic kidney disease, renal stones  FH of osteopenia in Mother     Started Forteo in July 2023    DEXA scan/BMD over years :      Date                           Lumbar spine                                 Left femur/hip                                     Right femur/hip    5/12/23                          -3.3                                                     -2.6                                                       -2.5                   LABS :  ------------  6/21/23 : Normal PTH/Vitamin D/Phosphate/TSH                          Past Medical History:   Diagnosis Date    Anxiety     GERD (gastroesophageal reflux disease)     Hypertension     Lipid disorder        Past Surgical History   Procedure Laterality Date    Cholecystectomy      Oophorectomy      Tubal ligation         Social History     Tobacco Use    Smoking status: Never     Passive exposure: Never    Smokeless tobacco: Never   Vaping Use    Vaping status: Never Used   Substance  Use Topics    Alcohol use: Yes     Comment: socially    Drug use: Never       Family History   Problem Relation Name Age of Onset    Heart disease Biological Father      Hyperlipidemia Biological Father         Allergies:  Amoxicillin, Azithromycin, Erythromycin base, Levofloxacin, Penicillins, Sulfanilamide, and Tetracycline    Current Outpatient Medications   Medication Sig Dispense Refill    atorvastatin (LIPITOR) 20 mg tablet Take 20 mg by mouth every evening.      calcium citrate-vitamin D3 (CITRACAL) 200 mg-6.25 mcg (250 unit) tablet Take 600 tablets by mouth daily.      citalopram (celeXA) 40 mg tablet TAKE 1 TABLET BY MOUTH DAILY 90 tablet 2    ezetimibe (ZETIA) 10 mg tablet Take 10 mg by mouth every evening.      FA/mv,Ca,iron,min/lycopene/lut (MULTIVITAL ORAL) No SIG Entered      famotidine (PEPCID) 10 mg tablet Take 10 mg by mouth 2 (two) times a day. Patient states taking once daily      fexofenadine-pseudoephedrine (ALLEGRA-D 24 HOUR) 180-240 mg per 24 hr tablet take 1 tablet by oral route  every day on an empty stomach with a glass of water 90 tablet 1    fluticasone propionate (FLONASE) 50 mcg/actuation nasal spray inhale 1 spray (50MCG)  by intranasal route  every day in each nostril      lisinopriL (PRINIVIL) 20 mg tablet Take 20 mg by mouth daily.      omega-3 fatty acids/fish oil (FISH OIL OMEGA 3-6-9 ORAL) No SIG Entered      pantoprazole (PROTONIX) 40 mg EC tablet Take 40 mg by mouth daily.      teriparatide 20 mcg/dose (620mcg/2.48mL) pen injector       vitamin E, dl, acetate, 22.5 mg/mL drops Take by mouth daily.       No current facility-administered medications for this visit.       Review of Systems   Constitutional:  Positive for fatigue. Negative for fever and unexpected weight change.   HENT:  Negative for mouth sores and nosebleeds.    Eyes:  Negative for visual disturbance.   Respiratory:  Negative for cough and shortness of breath.    Cardiovascular:  Negative for chest pain and leg  swelling.   Gastrointestinal:  Negative for abdominal pain, blood in stool and diarrhea.   Genitourinary:  Negative for dysuria, frequency and hematuria.   Musculoskeletal:  Positive for arthralgias. Negative for back pain, joint swelling, myalgias and neck pain.   Skin:  Negative for rash.   Neurological:  Negative for weakness, numbness and headaches.   Hematological:  Does not bruise/bleed easily.   Psychiatric/Behavioral:  Negative for agitation and confusion. The patient is not nervous/anxious.         Physical Exam  Constitutional:       Appearance: Normal appearance. She is normal weight.   HENT:      Mouth/Throat:      Pharynx: Oropharynx is clear.   Eyes:      Conjunctiva/sclera: Conjunctivae normal.   Cardiovascular:      Rate and Rhythm: Normal rate and regular rhythm.      Pulses: Normal pulses.      Heart sounds: Normal heart sounds.   Pulmonary:      Effort: Pulmonary effort is normal.      Breath sounds: Normal breath sounds. No wheezing.   Abdominal:      General: Bowel sounds are normal.      Palpations: Abdomen is soft.      Tenderness: There is no abdominal tenderness.   Musculoskeletal:         General: No swelling or tenderness. Normal range of motion.      Cervical back: Normal range of motion and neck supple.      Right lower leg: No edema.      Left lower leg: No edema.   Skin:     General: Skin is warm.      Findings: Bruising present. No rash.      Comments: Mild bruising on both sides of umbilicus without any underlying hematoma    Neurological:      General: No focal deficit present.      Mental Status: She is alert and oriented to person, place, and time.      Motor: No weakness.   Psychiatric:         Mood and Affect: Mood normal.         Behavior: Behavior normal.          There were no vitals filed for this visit.      Wt Readings from Last 3 Encounters:   09/16/24 66.4 kg (146 lb 6.4 oz)   06/10/24 66.2 kg (146 lb)   06/03/24 67.4 kg (148 lb 9.6 oz)       Body mass index is 26.78  "kg/m².    Auto-immune panel  No results found for: \"JASKARAN\", \"RF\", \"CCP\", \"DSDNAAB\", \"SMITHRNP\", \"SCL70\", \"CENTBAB\", \"JO1\", \"SSAAB\", \"SSBAB\", \"RNAPOLY\"     Hematology  Lab Results   Component Value Date    WBC 4.8 12/12/2023    HGB 12.8 12/12/2023    HCT 39.0 12/12/2023     12/12/2023       Chemistries  Lab Results   Component Value Date     09/24/2024    K 4.7 09/24/2024     09/24/2024    CREATININE 0.82 09/24/2024    BUN 18 09/24/2024    CO2 29 09/24/2024    GLUCOSE 98 09/24/2024    CALCIUM 10.6 (H) 09/24/2024    MG 2.2 11/07/2017    ALT 28 09/24/2024    AST 24 09/24/2024       Cholesterol  Lab Results   Component Value Date    CHOL 157 09/24/2024    TRIG 206 (H) 09/24/2024    HDL 55 09/24/2024    LDLCALC 72 09/24/2024       Endocrine  Lab Results   Component Value Date    TSH 2.56 06/21/2023         Assessment/Plan     Kolby Alonso is a 64 y.o. female with Past Medical History of hyperlipidemia, GERD, hypertension, anxiety   Is here for follow up  of osteoporosis    1. Age-related osteoporosis without current pathological fracture (Primary)  Osteoporosis diagnosed in 2023  based on the DEXA scan done on 5/12/23  Worse BMD in lumbar spine with a T score of -3.3  No recent fractures but has a h/o R foot # in past   Started Forteo/Teripartaide daily SQ injections in July 2023(finished 11/22/24)  Will switch her to IV Reclast 5 mg once a year infusions for maintenance   Once she gets labs, will start paperwork for Reclast  Next DEXA scan due in May 2025    Recommend taking daily calcium approximately 1200 mg (either from dietary intake or through supplements) and daily vitamin D of at least 1000 to 2000 international units  Encourage weightbearing exercises for at least 30 minutes on most days of the week and incorporate muscle-strengthening and posture exercises two to three days a week    - Comprehensive metabolic panel; Future  - Vitamin D 25 hydroxy; Future    2. High risk medication use  High " risk medication requiring regular monitoring      3. Chronic low back pain without sciatica, unspecified back pain laterality  Chronic low back pain  Likely has some lumbar DJD  Recommend back strengthening exercises               Jodie Velez MD  1/20/2025

## 2025-01-22 LAB
25(OH)D3+25(OH)D2 SERPL-MCNC: 62 NG/ML (ref 30–100)
ALBUMIN SERPL-MCNC: 4.1 G/DL (ref 3.6–5.1)
ALBUMIN/GLOB SERPL: 1.7 (CALC) (ref 1–2.5)
ALP SERPL-CCNC: 103 U/L (ref 37–153)
ALT SERPL-CCNC: 23 U/L (ref 6–29)
AST SERPL-CCNC: 20 U/L (ref 10–35)
BILIRUB SERPL-MCNC: 0.3 MG/DL (ref 0.2–1.2)
BUN SERPL-MCNC: 17 MG/DL (ref 7–25)
BUN/CREAT SERPL: NORMAL (CALC) (ref 6–22)
CALCIUM SERPL-MCNC: 9.5 MG/DL (ref 8.6–10.4)
CHLORIDE SERPL-SCNC: 105 MMOL/L (ref 98–110)
CO2 SERPL-SCNC: 28 MMOL/L (ref 20–32)
CREAT SERPL-MCNC: 0.8 MG/DL (ref 0.5–1.05)
EGFRCR SERPLBLD CKD-EPI 2021: 81 ML/MIN/1.73M2
GLOBULIN SER CALC-MCNC: 2.4 G/DL (CALC) (ref 1.9–3.7)
GLUCOSE SERPL-MCNC: 98 MG/DL (ref 65–99)
POTASSIUM SERPL-SCNC: 4.5 MMOL/L (ref 3.5–5.3)
PROT SERPL-MCNC: 6.5 G/DL (ref 6.1–8.1)
SODIUM SERPL-SCNC: 139 MMOL/L (ref 135–146)

## 2025-03-17 ENCOUNTER — TELEPHONE (OUTPATIENT)
Dept: RHEUMATOLOGY | Facility: CLINIC | Age: 65
End: 2025-03-17
Payer: MEDICARE

## 2025-03-17 DIAGNOSIS — M81.0 AGE-RELATED OSTEOPOROSIS WITHOUT CURRENT PATHOLOGICAL FRACTURE: Primary | ICD-10-CM

## 2025-03-17 RX ORDER — EPINEPHRINE 1 MG/ML
0.5 INJECTION, SOLUTION INTRAMUSCULAR; SUBCUTANEOUS ONCE AS NEEDED
OUTPATIENT
Start: 2025-03-24

## 2025-03-17 RX ORDER — ZOLEDRONIC ACID 5 MG/100ML
5 INJECTION, SOLUTION INTRAVENOUS ONCE
OUTPATIENT
Start: 2025-03-24

## 2025-03-17 RX ORDER — FAMOTIDINE 10 MG/ML
20 INJECTION, SOLUTION INTRAVENOUS ONCE AS NEEDED
OUTPATIENT
Start: 2025-03-24

## 2025-03-19 ENCOUNTER — TELEPHONE (OUTPATIENT)
Dept: FAMILY MEDICINE | Facility: CLINIC | Age: 65
End: 2025-03-19
Payer: MEDICARE

## 2025-03-19 NOTE — TELEPHONE ENCOUNTER
----- Message from Jodie Velez sent at 3/18/2025  3:45 PM EDT -----  Regarding: RE: Reclast scheduling  Nope. I saw her in January.  ----- Message -----  From: Sandra Robins MA  Sent: 3/17/2025   3:15 PM EDT  To: Jodie Velez MD  Subject: RE: Reclast scheduling                           No OV needed right?  ----- Message -----  From: Jodie Velez MD  Sent: 3/17/2025   1:12 PM EDT  To: Sandra Robins MA  Subject: Reclast scheduling                               Can you please call her and schedule Reclast at [facility]. I already placed orders.

## 2025-05-19 ENCOUNTER — TELEPHONE (OUTPATIENT)
Dept: RHEUMATOLOGY | Facility: CLINIC | Age: 65
End: 2025-05-19
Payer: MEDICARE

## 2025-06-03 SDOH — ECONOMIC STABILITY: INCOME INSECURITY: IN THE LAST 12 MONTHS, WAS THERE A TIME WHEN YOU WERE NOT ABLE TO PAY THE MORTGAGE OR RENT ON TIME?: NO

## 2025-06-03 SDOH — ECONOMIC STABILITY: FOOD INSECURITY: WITHIN THE PAST 12 MONTHS, YOU WORRIED THAT YOUR FOOD WOULD RUN OUT BEFORE YOU GOT MONEY TO BUY MORE.: NEVER TRUE

## 2025-06-03 SDOH — ECONOMIC STABILITY: FOOD INSECURITY: WITHIN THE PAST 12 MONTHS, THE FOOD YOU BOUGHT JUST DIDN'T LAST AND YOU DIDN'T HAVE MONEY TO GET MORE.: NEVER TRUE

## 2025-06-03 SDOH — ECONOMIC STABILITY: TRANSPORTATION INSECURITY
IN THE PAST 12 MONTHS, HAS LACK OF TRANSPORTATION KEPT YOU FROM MEETINGS, WORK, OR FROM GETTING THINGS NEEDED FOR DAILY LIVING?: NO

## 2025-06-03 SDOH — ECONOMIC STABILITY: TRANSPORTATION INSECURITY
IN THE PAST 12 MONTHS, HAS THE LACK OF TRANSPORTATION KEPT YOU FROM MEDICAL APPOINTMENTS OR FROM GETTING MEDICATIONS?: NO

## 2025-06-03 ASSESSMENT — SOCIAL DETERMINANTS OF HEALTH (SDOH): IN THE PAST 12 MONTHS, HAS THE ELECTRIC, GAS, OIL, OR WATER COMPANY THREATENED TO SHUT OFF SERVICE IN YOUR HOME?: NO

## 2025-06-10 ENCOUNTER — HOSPITAL ENCOUNTER (OUTPATIENT)
Dept: RADIOLOGY | Age: 65
Discharge: HOME | End: 2025-06-10
Attending: STUDENT IN AN ORGANIZED HEALTH CARE EDUCATION/TRAINING PROGRAM
Payer: MEDICARE

## 2025-06-10 ENCOUNTER — OFFICE VISIT (OUTPATIENT)
Dept: FAMILY MEDICINE | Facility: CLINIC | Age: 65
End: 2025-06-10
Payer: MEDICARE

## 2025-06-10 VITALS
DIASTOLIC BLOOD PRESSURE: 90 MMHG | RESPIRATION RATE: 16 BRPM | BODY MASS INDEX: 27.6 KG/M2 | TEMPERATURE: 97.7 F | HEART RATE: 92 BPM | HEIGHT: 62 IN | SYSTOLIC BLOOD PRESSURE: 150 MMHG | WEIGHT: 150 LBS | OXYGEN SATURATION: 99 %

## 2025-06-10 DIAGNOSIS — M54.6 ACUTE MIDLINE THORACIC BACK PAIN: ICD-10-CM

## 2025-06-10 DIAGNOSIS — Z00.00 MEDICARE ANNUAL WELLNESS VISIT, SUBSEQUENT: Primary | ICD-10-CM

## 2025-06-10 DIAGNOSIS — I10 ESSENTIAL HYPERTENSION: ICD-10-CM

## 2025-06-10 DIAGNOSIS — Z12.31 BREAST CANCER SCREENING BY MAMMOGRAM: ICD-10-CM

## 2025-06-10 PROBLEM — N89.8 VAGINAL IRRITATION: Status: RESOLVED | Noted: 2024-05-21 | Resolved: 2025-06-10

## 2025-06-10 PROBLEM — K44.9 HIATAL HERNIA: Status: ACTIVE | Noted: 2022-07-19

## 2025-06-10 PROBLEM — R10.2 SUPRAPUBIC PAIN: Status: RESOLVED | Noted: 2024-05-21 | Resolved: 2025-06-10

## 2025-06-10 PROBLEM — Z79.899 HIGH RISK MEDICATION USE: Status: RESOLVED | Noted: 2023-08-01 | Resolved: 2025-06-10

## 2025-06-10 PROCEDURE — 72072 X-RAY EXAM THORAC SPINE 3VWS: CPT

## 2025-06-10 PROCEDURE — 72040 X-RAY EXAM NECK SPINE 2-3 VW: CPT

## 2025-06-10 ASSESSMENT — MINI COG
COMPLETED: YES
TOTAL SCORE: 5

## 2025-06-10 ASSESSMENT — PATIENT HEALTH QUESTIONNAIRE - PHQ9: SUM OF ALL RESPONSES TO PHQ9 QUESTIONS 1 & 2: 0

## 2025-06-10 NOTE — PROGRESS NOTES
Subjective     Kolby Alonso is a 65 y.o. female who presents for a subsequent annual wellness visit.     Patient Care Team:  Carmen Haque DO as PCP - General (Family Medicine)  Pattie Mota MD as Referring Physician (Obstetrics and Gynecology)  Millie Ellsworth MD as Referring Physician (Gastroenterology)    Had a fall 4/22 of this year getting out of bed, trying to step over her dog that was at the base of the bed. Now with achines in posterior shoulder and burning sensation where her bra line is in the midline. Does knit every night. Worried about fracture in the setting of osteoporosis.      RUE lipoma stable as far as she can tell    # PMHx  Anxiety-taking Celexa 40 mg daily  HLD-taking atorvastatin 20 mg daily, fish oil, and ezetimibe 10 mg daily. Following with Dr. Oquendo.  HTN- taking lisinopril 20 mg daily ; not checking home BP's, following with cardiology  GERD- taking Famotidine only, following with GI, needs repeat EGD, not on PPI due to effects on bone health  Osteoporosis- was following with Dr. Velez, who has since left, was on forteo; has appt scheduled with Dr. Calvillo     GYN is Dr. Mota  Immunization- will get Tdap and covid at pharmacy  DEXA on 5/16/25- improved T scores on forteo  Mammo due. 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    Comprehensive Medical and Social History  Patient Active Problem List   Diagnosis    Chronic seasonal allergic rhinitis due to pollen    Anxiety and depression    Essential hypertension    Gastroesophageal reflux disease without esophagitis    Mixed hyperlipidemia    Family history of coronary artery disease in father    Medicare annual wellness visit, subsequent    Age-related osteoporosis without current pathological fracture    Chronic low back pain without sciatica    Other disorders of  calcium metabolism    Hiatal hernia    Acute midline thoracic back pain     Past Medical History:   Diagnosis Date    Anxiety     GERD (gastroesophageal reflux disease)     Hypertension     Lipid disorder      Past Surgical History   Procedure Laterality Date    Cholecystectomy      Oophorectomy      Tubal ligation       Allergies   Allergen Reactions    Amoxicillin Rash    Azithromycin Rash    Erythromycin Base GI intolerance    Levofloxacin Rash    Penicillins Rash    Sulfanilamide Rash    Tetracycline GI intolerance     Current Outpatient Medications   Medication Sig Dispense Refill    atorvastatin (LIPITOR) 20 mg tablet Take 20 mg by mouth every evening.      calcium citrate-vitamin D3 (CITRACAL) 200 mg-6.25 mcg (250 unit) tablet Take 600 tablets by mouth daily.      citalopram (celeXA) 40 mg tablet TAKE 1 TABLET BY MOUTH DAILY 90 tablet 2    ezetimibe (ZETIA) 10 mg tablet Take 10 mg by mouth every evening.      FA/mv,Ca,iron,min/lycopene/lut (MULTIVITAL ORAL) No SIG Entered      famotidine (PEPCID) 10 mg tablet Take 10 mg by mouth 2 (two) times a day. Patient states taking once daily      fexofenadine-pseudoephedrine (ALLEGRA-D 24 HOUR) 180-240 mg per 24 hr tablet take 1 tablet by oral route  every day on an empty stomach with a glass of water 90 tablet 1    fluticasone propionate (FLONASE) 50 mcg/actuation nasal spray inhale 1 spray (50MCG)  by intranasal route  every day in each nostril      lisinopriL (PRINIVIL) 20 mg tablet Take 20 mg by mouth daily.      omega-3 fatty acids/fish oil (FISH OIL OMEGA 3-6-9 ORAL) No SIG Entered       No current facility-administered medications for this visit.     Social History     Tobacco Use    Smoking status: Never     Passive exposure: Never    Smokeless tobacco: Never   Vaping Use    Vaping status: Never Used   Substance Use Topics    Alcohol use: Yes     Comment: socially    Drug use: Never     Family History   Problem Relation Name Age of Onset    Heart disease  "Biological Father      Hyperlipidemia Biological Father       Objective   Vitals  Vitals:    06/10/25 1303   BP: (!) 150/90   BP Location: Right upper arm   Patient Position: Sitting   Pulse: 92   Resp: 16   Temp: 36.5 °C (97.7 °F)   TempSrc: Temporal   SpO2: 99%   Weight: 68 kg (150 lb)   Height: 1.575 m (5' 2\")     Body mass index is 27.44 kg/m².    Advanced Care Plan  Does patient have advance directive?: No                                     PHQ  Will the patient answer the depression questions?: Yes   Little interest or pleasure in doing things: Not at all   Feeling down, depressed, or hopeless: Not at all   Depression Risk: 0                                             Mini Cog  Completed: Yes  Score: 5  Result: Negative    Get Up and Go  Result: Pass    STEADI Falls Risk  One or more falls in the last year: Yes   How many times: 1   Was the patient injured in any fall: No   Has trouble stepping up onto a curb: No   Advised to use a cane or walker to get around safely: No   Often has to rush to the toilet: No   Feels unsteady when walking: No   Has lost some feeling in feet: No   Often feels sad or depressed: No   Steadies self on furniture while walking at home: No   Takes medication that makes him/her feel lightheaded or more tired than usual: No   Worried about falling: No   Takes medicine to sleep or improve mood: Yes   Needs to push with hands when rising from a chair: Yes   Falls screen completed: Yes     Assessment/Plan   Diagnoses and all orders for this visit:    Medicare annual wellness visit, subsequent (Primary)  Assessment & Plan:  Medicare physical: reviewed routine health screening and prevention with patient for the next 5-10 years and they have been educated as to when things will be due.  Depression screen and fall screen have been performed.  Please see scanned and reviewed Patient Self assessment for further information.  No Cognitive impairment was detected.  View Patient Instructions for " other identified risks and recommended interventions  Patient specific information printed on patient instructions, including Health Maintenance list with due date.        Essential hypertension  Assessment & Plan:  Elevated BP thought to be 2/2 pain, anxiety today. Continue Lisinopril and send me home BP readings. F/U with new cardiologist scheduled    Orders:  -     MyChart BP Flowsheet; Future    Acute midline thoracic back pain  Assessment & Plan:  Likely OA flare 2/2 postural changes and recent fall. In setting of osteoporosis, xrays ordered    Orders:  -     X-RAY CERVICAL SPINE 2 OR 3 VIEWS; Future    Breast cancer screening by mammogram  -     BI SCREENING MAMMOGRAM BILATERAL(TOMOSYNTHESIS); Future    See Patient Instructions (the written plan) which was given to the patient for PPPS and health risk factors with interventions.     Return in about 1 year (around 6/10/2026) for Medicare annual wellness.

## 2025-06-10 NOTE — ASSESSMENT & PLAN NOTE
Elevated BP thought to be 2/2 pain, anxiety today. Continue Lisinopril and send me home BP readings. F/U with new cardiologist scheduled

## 2025-06-10 NOTE — PATIENT INSTRUCTIONS
Your Personalized Prevention Plan Services (PPPS)    Preventive Services Checklist (Assumes Average Risk Unless Otherwise Noted):    Health Maintenance Topics with due status: Overdue       Topic Date Due    DTaP, Tdap, and Td Vaccines 02/15/2022    COVID-19 Vaccine 04/10/2025    Breast Cancer Screening 06/05/2025    Depression Screening 06/10/2025    Falls Risk Screening 06/10/2025     Health Maintenance Topics with due status: Not Due       Topic Last Completion Date    Cervical Cancer Screening 06/04/2024    Colorectal Cancer Screening 07/01/2024    DEXA Scan 05/16/2025    Medicare Annual Wellness Visit 06/10/2025    RSV Vaccine Not Due     Health Maintenance Topics with due status: Completed       Topic Last Completion Date    Hepatitis C Screening 11/07/2017    Pneumococcal (50 years of age and older) 06/05/2023    Influenza Vaccine 11/21/2024     Health Maintenance Topics with due status: Addressed       Topic Date Due    Zoster Vaccine Addressed     Health Maintenance Topics with due status: Aged Out       Topic Date Due    Meningococcal ACWY Aged Out    RSV <20 months Aged Out    HIB Vaccines Aged Out    Hepatitis B Vaccines Aged Out    IPV Vaccines Aged Out    Meningococcal B Aged Out    HPV Vaccines Aged Out       You May Be Eligible for These Additional Preventive Services   (Assumes Average Risk Unless Otherwise Noted)  Diabetes Screening Any 1 risk factor: hypertension, dyslipidemia, obesity, high glucose; or Any 2 risk factors: >=64yo, overweight, family history diabetes (covered every 6 months)   Hepatitis C Screening Any 1 risk factor: 1) blood transfusion before 1992,   2) current or past injection drug use (annually for high risk; if born between 6889-8530, see above for status).   Vaccine: Hepatitis B As necessary if at-risk: hemophilia, ESRD, diabetes, living with individual infected with hep B, healthcare worker with frequent contact with blood/bodily fluids (series covered  once)   Sexually Transmitted Diseases (STDs) As necessary chlamydia, gonorrhea, syphilis, hepatitis B (covered annually)  HIV if any 1 risk factor present: 1) <14yo or >66yo and at increased risk or 2) 15-66yo and ask for it (covered annually)   Lung Cancer Screening Low dose chest CT if all three risk factors: 1) 50-76yo, 2) smoker or quit within last 15y, 3) >=20 pack years (covered annually).  No results found for this or any previous visit.       Cholesterol Screening Both risk factors: 1) >=19yo and 2)  increased risk coronary artery disease (covered every 5 years).   Breast Cancer Screening Covered once 35-40yo, annually >=39yo (if >=51yo, see above for status).       Health Risk Factors with Personalized Education:  ----------------------------------------------------------------------------------------------------------------------  Stress management:  Understanding Your Stress  Think about how you know when you’re stressed.  Think about how your thoughts or behaviors are different when you’re stressed.  Think about what triggers stress for you.  Think about how you handle stress, and whether you’re making unhealthy choices in response to stress (like smoking, drinking alcohol or overeating).  Managing Stress  Take a break from the stressful situation.  Reduce your stress levels by exercising, talking with family/friends, ensuring adequate sleep.  Consider meditation or yoga.  Make sure you plan time to do things you enjoy.  Let your PCP know if you’re having problems limiting your stress.  ----------------------------------------------------------------------------------------------------------------------  Controlling Your Blood Pressure  Maintain a normal weight (body mass index between 18.5 and 24.9).  Eat more fruit, vegetables and low-fat dairy.  Eat less saturated fat and total fat.  Lower your sodium (salt) intake.  Try to stay under 1500 mg per day, but if you cannot get your intake to be that low,  at least lower it by 1000 mg.  Stay active.  Try to get at least 90 to 150 minutes of exercise per week.  Try brisk walking, swimming, bicycling or dancing.  Limit alcohol intake.  When you do consume alcohol, drink no more than 1 drink per day.  If you have been prescribed medication, take it regularly and exactly as prescribed.  Let your PCP know if you have any problems or questions about your medication.  Check your blood pressure at home or at the store.  Write down your readings and share them with your PCP  ----------------------------------------------------------------------------------------------------------------------  Controlling Your Cholesterol  Reduce the amount of saturated and trans fat in your diet.  Limit intake of red meat.  Consume only low-fat or non-fat/skim dairy.  Limit fried food.  Cook with vegetable oils.  Reduce your intake of sugary foods, sugary drinks and alcohol.  Eat a diet high in fruit, vegetables and whole grains.  Get protein from fish, poultry and a small portion of nuts.  Stay active.  Try to get at least 90 to 150 minutes of exercise per week.  Try brisk walking, swimming, bicycling or dancing.  Maintain a healthy weight by balancing your diet and exercise.  If you have been prescribed medication, take it regularly and exactly as prescribed. Let your PCP know if you have any problems or questions about your medication.  It’s important to know your cholesterol numbers.  When recommended by your PCP, get the cholesterol blood test.  ---------------------------------------------------------------------------------------------------------------------   Controlling Your Osteoporosis, Strengthening Your Bones  Try to get at least 90 to 150 minutes of weight-bearing exercise per week.  Ensure intake of at least 1200mg of calcium per day.  Eat foods high in calcium like milk and other dairy, green vegetables, fruit, canned fish with soft and edible bones, nuts, calcium-set tofu.   Some foods are calcium-fortified, like bread, cereal, fruit juices and mineral water.  Help your body make vitamin D by getting 10-15 minutes per day of sunlight.    Ensure intake of at least 600IU of vitamin D per day.  Eat foods high in vitamin D like oily fish (salmon, sardines, mackerel) and eggs.  Some foods are fortified with vitamin D, like dairy and cereals.  Avoid high amounts of caffeine and salt, since they can cause the body to loose calcium.  Limit alcohol intake, since it is associated with weaker bones and is associated with falls and fractures.  Limit intake of fizzy drinks.  If you have been prescribed medication, take it regularly and exactly as prescribed.  Let your PCP know if you have any problems or questions about your medication  ----------------------------------------------------------------------------------------------------------------------  Reducing Your Risk of Falls  Tell your PCP if any of your medications make you feel tired, dizzy, lightheaded or off-balance.  Maintain coordination, flexibility and balance by ensuring regular physical activity.  Limit alcohol intake to 1 drink per day.  Consider avoiding all alcohol intake.  Ensure good vision.  Visit an ophthalmologist or optometrist regularly for vision screening or to make sure your glasses / contact lens prescription is correct.  If you need glasses or contacts, wear them.  When you get new glasses or contacts, take time to get used to them.  Do not wear sunglasses or tinted lenses when indoors.  Ensure good hearing.  Have your hearing checked if you are having trouble hearing, or family and friends think you cannot hear them.  If you need a hearing aid, be sure it fits well and wear it.  Get enough rest.  Ensure about 7-9 hours of sleep every day.  Get up slowly from your bed or chairs.  Do not start walking until you are sure you feel steady.  Wear non-skid, rubber-soled, low-heeled shoes.  Do not walk in socks, or in shoes  and slippers with smooth soles.  If your PCP or therapist recommends using a cane or walker, use it regularly.  Make your home safer.  Increase lighting throughout the house, especially at the top and bottom of stairs.  Ensure lighting is easily turned on when getting up in the middle of the night.  Make sure there are two secure rails on all stairs.  Install grab bars in the bathtub / shower and near the toilet.  Consider using a shower chair and / or a hand-held shower.  Spread sand or salt on icy surfaces.  Beware of wet surfaces, which can be icy.  Tell your PCP if you have fallen.

## 2025-06-11 ENCOUNTER — RESULTS FOLLOW-UP (OUTPATIENT)
Dept: FAMILY MEDICINE | Facility: CLINIC | Age: 65
End: 2025-06-11

## 2025-06-11 RX ORDER — CITALOPRAM 40 MG/1
40 TABLET, FILM COATED ORAL DAILY
Qty: 90 TABLET | Refills: 2 | Status: SHIPPED | OUTPATIENT
Start: 2025-06-11